# Patient Record
Sex: FEMALE | Race: WHITE | NOT HISPANIC OR LATINO | URBAN - METROPOLITAN AREA
[De-identification: names, ages, dates, MRNs, and addresses within clinical notes are randomized per-mention and may not be internally consistent; named-entity substitution may affect disease eponyms.]

---

## 2021-06-16 ENCOUNTER — TRANSCRIBE ORDERS (OUTPATIENT)
Dept: LAB | Facility: HOSPITAL | Age: 66
End: 2021-06-16

## 2021-06-16 ENCOUNTER — APPOINTMENT (OUTPATIENT)
Dept: LAB | Facility: HOSPITAL | Age: 66
End: 2021-06-16
Attending: INTERNAL MEDICINE
Payer: MEDICARE

## 2021-06-16 DIAGNOSIS — E55.9 VITAMIN D DEFICIENCY, UNSPECIFIED: ICD-10-CM

## 2021-06-16 DIAGNOSIS — Z79.899 OTHER LONG TERM (CURRENT) DRUG THERAPY: ICD-10-CM

## 2021-06-16 DIAGNOSIS — M13.0 POLYARTHRITIS, UNSPECIFIED: ICD-10-CM

## 2021-06-16 DIAGNOSIS — M13.0 POLYARTHRITIS, UNSPECIFIED: Primary | ICD-10-CM

## 2021-06-16 LAB
25(OH)D3 SERPL-MCNC: 40 NG/ML (ref 30–100)
ALBUMIN SERPL-MCNC: 4.6 G/DL (ref 3.4–5)
ALP SERPL-CCNC: 56 IU/L (ref 35–126)
ALT SERPL-CCNC: 13 IU/L (ref 11–54)
ANION GAP SERPL CALC-SCNC: 11 MEQ/L (ref 3–15)
AST SERPL-CCNC: 18 IU/L (ref 15–41)
BASOPHILS # BLD: 0.04 K/UL (ref 0.01–0.1)
BASOPHILS NFR BLD: 0.8 %
BILIRUB SERPL-MCNC: 0.9 MG/DL (ref 0.3–1.2)
BUN SERPL-MCNC: 12 MG/DL (ref 8–20)
CALCIUM SERPL-MCNC: 9.9 MG/DL (ref 8.9–10.3)
CHLORIDE SERPL-SCNC: 97 MEQ/L (ref 98–109)
CO2 SERPL-SCNC: 24 MEQ/L (ref 22–32)
CREAT SERPL-MCNC: 0.7 MG/DL (ref 0.6–1.1)
CRP SERPL-MCNC: <6 MG/L
DIFFERENTIAL METHOD BLD: NORMAL
EOSINOPHIL # BLD: 0.04 K/UL (ref 0.04–0.36)
EOSINOPHIL NFR BLD: 0.8 %
ERYTHROCYTE [DISTWIDTH] IN BLOOD BY AUTOMATED COUNT: 13 % (ref 11.7–14.4)
ERYTHROCYTE [SEDIMENTATION RATE] IN BLOOD BY WESTERGREN METHOD: 13 MM/HR
GFR SERPL CREATININE-BSD FRML MDRD: >60 ML/MIN/1.73M*2
GLUCOSE SERPL-MCNC: 98 MG/DL (ref 70–99)
HCT VFR BLDCO AUTO: 38.9 % (ref 35–45)
HGB BLD-MCNC: 12.9 G/DL (ref 11.8–15.7)
IMM GRANULOCYTES # BLD AUTO: 0.01 K/UL (ref 0–0.08)
IMM GRANULOCYTES NFR BLD AUTO: 0.2 %
LYMPHOCYTES # BLD: 1.65 K/UL (ref 1.2–3.5)
LYMPHOCYTES NFR BLD: 32.3 %
MCH RBC QN AUTO: 30.1 PG (ref 28–33.2)
MCHC RBC AUTO-ENTMCNC: 33.2 G/DL (ref 32.2–35.5)
MCV RBC AUTO: 90.9 FL (ref 83–98)
MONOCYTES # BLD: 0.53 K/UL (ref 0.28–0.8)
MONOCYTES NFR BLD: 10.4 %
NEUTROPHILS # BLD: 2.84 K/UL (ref 1.7–7)
NEUTS SEG NFR BLD: 55.5 %
NRBC BLD-RTO: 0 %
PDW BLD AUTO: 9.8 FL (ref 9.4–12.3)
PLATELET # BLD AUTO: 284 K/UL (ref 150–369)
POTASSIUM SERPL-SCNC: 4.2 MEQ/L (ref 3.6–5.1)
PROT SERPL-MCNC: 7.1 G/DL (ref 6–8.2)
RBC # BLD AUTO: 4.28 M/UL (ref 3.93–5.22)
SODIUM SERPL-SCNC: 132 MEQ/L (ref 136–144)
WBC # BLD AUTO: 5.11 K/UL (ref 3.8–10.5)

## 2021-06-16 PROCEDURE — 82306 VITAMIN D 25 HYDROXY: CPT

## 2021-06-16 PROCEDURE — 86200 CCP ANTIBODY: CPT

## 2021-06-16 PROCEDURE — 86038 ANTINUCLEAR ANTIBODIES: CPT

## 2021-06-16 PROCEDURE — 80053 COMPREHEN METABOLIC PANEL: CPT

## 2021-06-16 PROCEDURE — 86431 RHEUMATOID FACTOR QUANT: CPT

## 2021-06-16 PROCEDURE — 86140 C-REACTIVE PROTEIN: CPT

## 2021-06-16 PROCEDURE — 85025 COMPLETE CBC W/AUTO DIFF WBC: CPT

## 2021-06-16 PROCEDURE — 85652 RBC SED RATE AUTOMATED: CPT

## 2021-06-16 PROCEDURE — 36415 COLL VENOUS BLD VENIPUNCTURE: CPT

## 2021-06-17 LAB — RHEUMATOID FACT SERPL-ACNC: 9 IU/ML

## 2021-06-22 LAB — CCP IGA+IGG SERPL IA-ACNC: <8 UNITS

## 2021-06-24 LAB — ANA SER QL HEP2 SUBST: NEGATIVE

## 2021-08-04 ENCOUNTER — APPOINTMENT (EMERGENCY)
Dept: RADIOLOGY | Facility: HOSPITAL | Age: 66
End: 2021-08-04
Attending: EMERGENCY MEDICINE
Payer: MEDICARE

## 2021-08-04 ENCOUNTER — HOSPITAL ENCOUNTER (EMERGENCY)
Facility: HOSPITAL | Age: 66
Discharge: HOME | End: 2021-08-04
Attending: EMERGENCY MEDICINE
Payer: MEDICARE

## 2021-08-04 VITALS
DIASTOLIC BLOOD PRESSURE: 78 MMHG | BODY MASS INDEX: 21.69 KG/M2 | OXYGEN SATURATION: 100 % | WEIGHT: 135 LBS | TEMPERATURE: 98 F | HEART RATE: 66 BPM | HEIGHT: 66 IN | RESPIRATION RATE: 16 BRPM | SYSTOLIC BLOOD PRESSURE: 127 MMHG

## 2021-08-04 DIAGNOSIS — S06.0X0A CONCUSSION WITHOUT LOSS OF CONSCIOUSNESS, INITIAL ENCOUNTER: ICD-10-CM

## 2021-08-04 DIAGNOSIS — S69.92XA INJURY OF LEFT WRIST, INITIAL ENCOUNTER: ICD-10-CM

## 2021-08-04 DIAGNOSIS — S09.90XA CLOSED HEAD INJURY, INITIAL ENCOUNTER: ICD-10-CM
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
## 2021-08-04 PROCEDURE — 73110 X-RAY EXAM OF WRIST: CPT | Mod: LT

## 2021-08-04 PROCEDURE — 63700000 HC SELF-ADMINISTRABLE DRUG: Performed by: PHYSICIAN ASSISTANT

## 2021-08-04 PROCEDURE — 73130 X-RAY EXAM OF HAND: CPT | Mod: LT

## 2021-08-04 PROCEDURE — 99284 EMERGENCY DEPT VISIT MOD MDM: CPT | Mod: 25

## 2021-08-04 PROCEDURE — 70450 CT HEAD/BRAIN W/O DYE: CPT | Mod: ME

## 2021-08-04 RX ORDER — LISINOPRIL 40 MG/1
40 TABLET ORAL
COMMUNITY
Start: 2021-06-17 | End: 2023-01-03

## 2021-08-04 RX ORDER — ONDANSETRON 8 MG/1
8 TABLET, ORALLY DISINTEGRATING ORAL EVERY 8 HOURS PRN
Qty: 12 TABLET | Refills: 0 | Status: SHIPPED | OUTPATIENT
Start: 2021-08-04 | End: 2022-09-07

## 2021-08-04 RX ORDER — ZOLPIDEM TARTRATE 6.25 MG/1
TABLET, FILM COATED, EXTENDED RELEASE ORAL
COMMUNITY
Start: 2021-06-11 | End: 2023-02-07 | Stop reason: SDUPTHER

## 2021-08-04 RX ORDER — ONDANSETRON 4 MG/1
4 TABLET, ORALLY DISINTEGRATING ORAL ONCE
Status: COMPLETED | OUTPATIENT
Start: 2021-08-04 | End: 2021-08-04

## 2021-08-04 RX ADMIN — ONDANSETRON 4 MG: 4 TABLET, ORALLY DISINTEGRATING ORAL at 09:21

## 2021-08-04 ASSESSMENT — ENCOUNTER SYMPTOMS
PALPITATIONS: 0
DIARRHEA: 1
DIZZINESS: 0
CHEST TIGHTNESS: 0
SHORTNESS OF BREATH: 0
VOMITING: 0
HEADACHES: 1
NAUSEA: 1
BACK PAIN: 0
BLOOD IN STOOL: 0
ABDOMINAL PAIN: 0
DIFFICULTY URINATING: 0
LOSS OF CONSCIOUSNESS: 0

## 2021-08-04 NOTE — ED PROVIDER NOTES
Emergency Medicine Note  HPI   HISTORY OF PRESENT ILLNESS     67yo female presenting to ER for evaluation after fall yesterday. Pt was outdoors, tripped over a hose, and believes she landed on her left side. She hit the left side of her head. She is not on blood thinners. She did not lose consciousness. Pt began to feel very nauseous after the fall and did have episodes of diarrhea. She has been able to drink, but reports she is afraid to eat in case she vomits. Diarrhea has been watery, somewhat yellow in color. She was recently treated several weeks ago for diverticulitis and says the abdominal pain she had with that has resolved after the treatment. She did have recent bout of diverticulitis She denies any chest pain, SOB, vomiting, blurred vision, slurred speech, gait disturbance.       History provided by:  Patient   used: No    Trauma  Mechanism of injury: fall  Injury location: head/neck  Injury location detail: head  Arrived directly from scene: no     Fall:       Fall occurred: walking and tripped       Point of impact: unknown       Entrapped after fall: no    Protective equipment:        None       Suspicion of alcohol use: no       Suspicion of drug use: no    EMS/PTA data:       Bystander interventions: none       Ambulatory at scene: yes       Blood loss: none       Responsiveness: alert       Oriented to: person, place, situation and time       Loss of consciousness: no       Amnesic to event: no    Current symptoms:       Associated symptoms:             Reports headache and nausea.             Denies abdominal pain, back pain, chest pain, loss of consciousness and vomiting.         Patient History   PAST HISTORY     Reviewed from Nursing Triage:      Past Medical History:   Diagnosis Date   • Breast cancer (CMS/HCC)        Past Surgical History:   Procedure Laterality Date   • BREAST LUMPECTOMY     • HYSTERECTOMY         History reviewed. No pertinent family history.    Social  History     Tobacco Use   • Smoking status: Never Smoker   • Smokeless tobacco: Never Used   Substance Use Topics   • Alcohol use: Yes     Comment: occas   • Drug use: Never         Review of Systems   REVIEW OF SYSTEMS     Review of Systems   Respiratory: Negative for chest tightness and shortness of breath.    Cardiovascular: Negative for chest pain and palpitations.   Gastrointestinal: Positive for diarrhea and nausea. Negative for abdominal pain, blood in stool and vomiting.   Genitourinary: Negative for difficulty urinating.   Musculoskeletal: Negative for back pain.   Neurological: Positive for headaches. Negative for dizziness and loss of consciousness.         VITALS     ED Vitals    Date/Time Temp Pulse Resp BP SpO2 [facility]   08/04/21 1224 36.7 °C (98 °F) 66 16 127/78 100 % JLG   08/04/21 0859 36.6 °C (97.8 °F) 68 18 136/91 99 % LL        Pulse Ox %: 99 % (08/04/21 0859)  Pulse Ox Interpretation: Normal (08/04/21 0859)           Physical Exam   PHYSICAL EXAM     Physical Exam  Vitals and nursing note reviewed.   Constitutional:       Appearance: Normal appearance.   HENT:      Head: Normocephalic.      Comments: Left periorbital ecchymosis     Right Ear: Tympanic membrane normal.      Left Ear: Tympanic membrane normal.      Mouth/Throat:      Mouth: Mucous membranes are moist.   Eyes:      Extraocular Movements: Extraocular movements intact.      Pupils: Pupils are equal, round, and reactive to light.   Cardiovascular:      Rate and Rhythm: Normal rate and regular rhythm.   Pulmonary:      Effort: Pulmonary effort is normal.      Breath sounds: Normal breath sounds.   Abdominal:      Palpations: Abdomen is soft.      Tenderness: There is no abdominal tenderness. There is no guarding.   Musculoskeletal:      Left wrist: Swelling and tenderness (to dorsum of left hand and distal ulnar area) present. No effusion or snuff box tenderness. Decreased range of motion.      Cervical back: Normal range of motion and  neck supple. Muscular tenderness present. No spinous process tenderness.   Skin:     General: Skin is warm and dry.      Capillary Refill: Capillary refill takes less than 2 seconds.   Neurological:      Mental Status: She is alert and oriented to person, place, and time.      Cranial Nerves: No cranial nerve deficit.      Sensory: No sensory deficit.      Motor: No weakness.      Coordination: Coordination normal.      Gait: Gait normal.           PROCEDURES     Procedures     DATA     Results     None          Imaging Results          CT HEAD WITHOUT IV CONTRAST (Final result)  Result time 08/04/21 11:03:04    Final result                 Impression:    IMPRESSION:  1.  No evidence of hemorrhage or mass.  2.  Right basal ganglia hypoattenuation secondary to age indeterminant infarct  and should be clinically correlated.  3.  Findings suggestive of white matter changes mildly advanced for age but  nonspecific.    COMMENT:    Technique: Computed tomography of the brain was performed utilizing contiguous  2.5 mm transaxial sections without intravenous contrast administration.    CT DOSE:  One or more dose reduction techniques (e.g. automated exposure  control, adjustment of the mA and/or kV according to patient size, use of  iterative reconstruction technique) utilized for this examination.    Comparison studies: None.    Postsurgical change: None.    Brain parenchyma: There is no intraparenchymal hemorrhage, mass effect, midline  shift or extra-axial fluid collection.  There is a right basal ganglia focus of  hypoattenuation.  White matter changes: Mildly advanced for age but nonspecific.  Ventricles, cisterns, and sulci: Normal in size and configuration.  Sella and cerebellar tonsils: Normal in appearance.      Calvarium and extra cranial soft tissues: Normal.  Paranasal sinuses: Clear bilaterally.  Mastoid air cell: Normal  Orbits: Normal                 Narrative:    CLINICAL HISTORY: Trauma                                X-RAY WRIST LEFT 3+ VIEWS (Final result)  Result time 08/04/21 12:11:10    Final result                 Impression:    IMPRESSION:    A mildly distracted fracture of the ulnar styloid appears to be more likely  chronic; correlate clinically. See comment for additional findings.               Narrative:    CLINICAL HISTORY:    left wrist and hand pain after fall    COMMENT:   4 views of the left wrist and 4 views of the left hand are reviewed  without comparison.    No definite acute fracture or dislocation is identified.  A mildly distracted fracture of the ulnar styloid appears to be more likely  chronic; correlate clinically. There are advanced degenerative changes at the  basilar joint of the thumb and carpal joints involving the triquetrum. Joint  spaces in the hands and wrists otherwise appear within normal limits her with  mild degenerative changes.. Osteopenia is suggested.                    ED Interpretation    No acute fracture                             X-RAY HAND LEFT 3+ VIEWS (Final result)  Result time 08/04/21 12:11:10    Final result                 Impression:    IMPRESSION:    A mildly distracted fracture of the ulnar styloid appears to be more likely  chronic; correlate clinically. See comment for additional findings.               Narrative:    CLINICAL HISTORY:    left wrist and hand pain after fall    COMMENT:   4 views of the left wrist and 4 views of the left hand are reviewed  without comparison.    No definite acute fracture or dislocation is identified.  A mildly distracted fracture of the ulnar styloid appears to be more likely  chronic; correlate clinically. There are advanced degenerative changes at the  basilar joint of the thumb and carpal joints involving the triquetrum. Joint  spaces in the hands and wrists otherwise appear within normal limits her with  mild degenerative changes.. Osteopenia is suggested.                    ED Interpretation    No acute fracture                               No orders to display       Scoring tools                                 ED Course & MDM   MDM / ED COURSE and CLINICAL IMPRESSIONS     MDM    ED Course as of Aug 04 1447   Wed Aug 04, 2021   0918 Pt's neck cleared by NEXUS criteria. Pt here for evaluation after mechanical fall with head injury followed by nausea and left wrist pain. Will obtain imaging for both. Pt also mentions diarrhea, but denies abdominal pains.     [KL]   1135 IMPRESSION:  1.  No evidence of hemorrhage or mass.  2.  Right basal ganglia hypoattenuation secondary to age indeterminant infarct  and should be clinically correlated.  3.  Findings suggestive of white matter changes mildly advanced for age but  nonspecific.   CT HEAD WITHOUT IV CONTRAST [KL]   1204 Pt updated on results and evidence of age indeterminate infarct. She will follow up with PCP, orthopedics, concussion clinic. Will d/c with script for Zofran. Pt stable for d/c.     [KL]      ED Course User Index  [KL] Ramakrishna Meade PA C         Clinical Impressions as of Aug 04 1447   Fall, initial encounter   Closed head injury, initial encounter   Concussion without loss of consciousness, initial encounter   Injury of left wrist, initial encounter            Ramakrishna Meade PA C  08/04/21 1441

## 2021-08-04 NOTE — ED ATTESTATION NOTE
I have personally seen and examined the patient.  I reviewed and agree with physician assistant / nurse practitioner’s assessment and plan of care.     Exam: Patient is resting comfortably no acute distress. Her vital signs are stable and she is afebrile. Patient is awake alert and oriented with an intact neuro exam. HEENT exam reveals periorbital ecchymosis around the left eye. Pupils are equal round and reactive to light and there is no ocular entrapment. Patient's vision is baseline. Additionally, patient has tenderness of the left wrist without deformity. The extremity is neurovascularly intact.     Plan: Patient had a mechanical fall resulting in left wrist and left-sided head injury. Will check x-rays and CT of the head to further evaluate            Akbar Fried DO  08/04/21 0978

## 2021-08-04 NOTE — DISCHARGE INSTRUCTIONS
Take Tylenol and/or Motrin as needed for headache. Take Zofran as needed for nausea. Follow up with your primary care provider and concussion clinic. Follow up with orthopedics as needed. Keep your wrist in the splint, apply ice on and off for comfort.    Return to the ED for worsening of symptoms or any problems or concerns.  It is very important to follow up with your healthcare provider for re-evaluation.

## 2021-08-23 ENCOUNTER — TRANSCRIBE ORDERS (OUTPATIENT)
Dept: SCHEDULING | Facility: REHABILITATION | Age: 66
End: 2021-08-23

## 2021-08-23 DIAGNOSIS — R42 DIZZINESS AND GIDDINESS: ICD-10-CM

## 2021-08-23 DIAGNOSIS — R51.9 HEADACHE: ICD-10-CM

## 2021-08-23 DIAGNOSIS — S06.0X0S CONCUSSION WITHOUT LOSS OF CONSCIOUSNESS, SEQUELA (CMS/HCC): Primary | ICD-10-CM

## 2022-09-07 ENCOUNTER — OFFICE VISIT (OUTPATIENT)
Dept: INTERNAL MEDICINE | Facility: CLINIC | Age: 67
End: 2022-09-07
Payer: MEDICARE

## 2022-09-07 VITALS
HEART RATE: 75 BPM | DIASTOLIC BLOOD PRESSURE: 94 MMHG | SYSTOLIC BLOOD PRESSURE: 124 MMHG | RESPIRATION RATE: 17 BRPM | BODY MASS INDEX: 22.02 KG/M2 | WEIGHT: 137 LBS | OXYGEN SATURATION: 95 % | HEIGHT: 66 IN

## 2022-09-07 DIAGNOSIS — E78.5 HYPERLIPIDEMIA LDL GOAL <130: ICD-10-CM

## 2022-09-07 DIAGNOSIS — M47.819 SPONDYLOARTHRITIS: ICD-10-CM

## 2022-09-07 DIAGNOSIS — I10 PRIMARY HYPERTENSION: Primary | ICD-10-CM

## 2022-09-07 DIAGNOSIS — M81.0 AGE-RELATED OSTEOPOROSIS WITHOUT CURRENT PATHOLOGICAL FRACTURE: ICD-10-CM

## 2022-09-07 DIAGNOSIS — F51.01 PRIMARY INSOMNIA: ICD-10-CM

## 2022-09-07 DIAGNOSIS — I77.819 AORTIC DILATATION (CMS/HCC): ICD-10-CM

## 2022-09-07 DIAGNOSIS — R19.4 CHANGE IN BOWEL HABITS: ICD-10-CM

## 2022-09-07 DIAGNOSIS — Z85.3 HISTORY OF BREAST CANCER: ICD-10-CM

## 2022-09-07 DIAGNOSIS — E55.9 VITAMIN D DEFICIENCY: ICD-10-CM

## 2022-09-07 PROCEDURE — G8755 DIAS BP > OR = 90: HCPCS | Performed by: INTERNAL MEDICINE

## 2022-09-07 PROCEDURE — G8752 SYS BP LESS 140: HCPCS | Performed by: INTERNAL MEDICINE

## 2022-09-07 PROCEDURE — 99204 OFFICE O/P NEW MOD 45 MIN: CPT | Performed by: INTERNAL MEDICINE

## 2022-09-07 RX ORDER — HYDRALAZINE HYDROCHLORIDE 25 MG/1
25 TABLET, FILM COATED ORAL
COMMUNITY
End: 2023-01-04

## 2022-09-07 RX ORDER — MELOXICAM 7.5 MG/1
7.5 TABLET ORAL
COMMUNITY
Start: 2022-08-19 | End: 2023-01-23

## 2022-09-07 ASSESSMENT — ENCOUNTER SYMPTOMS
CONSTITUTIONAL NEGATIVE: 1
GASTROINTESTINAL NEGATIVE: 1
PSYCHIATRIC NEGATIVE: 1
CARDIOVASCULAR NEGATIVE: 1
RESPIRATORY NEGATIVE: 1
EYES NEGATIVE: 1
MUSCULOSKELETAL NEGATIVE: 1
NEUROLOGICAL NEGATIVE: 1
ALLERGIC/IMMUNOLOGIC NEGATIVE: 1
ENDOCRINE NEGATIVE: 1
HEMATOLOGIC/LYMPHATIC NEGATIVE: 1

## 2022-09-07 NOTE — PATIENT INSTRUCTIONS
Please ask Dr. Curtis about prolia injections for your osteoporosis    Please get your shingrix vaccine number two at the pharmacy when you get a chance

## 2022-09-07 NOTE — PROGRESS NOTES
SUBJECTIVE:   67 y.o. female for new patient visit    Here to establish care, she would like to have an annual physical, she is specifically concerned about some L hand numbness that she gets when she sleeps on her L side, when she rolls over it immediately resolves.  This has been occurring for 6 mos.  She has no shoulder or neck pain.  She does feel that her hand is weak however she feels that this started after she broke her wrist in 2017.  She is up to date on her mammograms and breast MRI within the last 6 mos which were normal without abnormal LAD.      HTN: following with cardiology, well controlled on lisinopril 40mg daily and hydralazine 25mg daily    HLD: she does follow with a cardiologist and was tried on lipitor but did not tolerate this and is planning to discuss this further with her cardiologist     Osteoporosis: this was diagnosed about 4 years ago on DEXA she has been tried on fosamax and another bisphosphonate but she was unable to tolerate due to body aches and fatigue, she has worsening in her spine on her DEXA this year and she is currently following with Dr. Curtis with whom she is following as below.      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 Curtis, getting cimzia injections    Hx breast ca: Breast cancer 06/2016 s/p lumpectomy/XRT ER/CA + HER2 neg, she has had no recurrence, she is now cured, she continues to follow with breast surgery once per year, up to date with MRI and mammo    GI distress: for the last 1.5 years she has had frequent small stools for which she is following with GI, her colonoscopy in 5/2022 was clear other than diverticulosis, she then started to have some bad cramping and thus she underwent a CT AP which demonstrated on focal narrow of the body of the stomach for which she is scheduled for upper GI in the coming weeks.      Ascending aortic dilation: 4.3 noted incidentally on recent breast MRI 2/2022 and was referred to cardiologist with whom she  now follows.      Primary insomnia: she takes ambien nightly    Past Medical History:   Diagnosis Date    Breast cancer (CMS/HCC)     Hypertension     Lipid disorder     Osteoporosis      Patient Active Problem List   Diagnosis    History of breast cancer    Hypertension    Osteoporosis    Change in bowel habits    Hyperlipidemia LDL goal <130    Primary insomnia     Past Surgical History:   Procedure Laterality Date    BREAST LUMPECTOMY      HYSTERECTOMY       Family History   Problem Relation Age of Onset    Hyperlipidemia Biological Mother     Hypertension Biological Mother     Thyroid disease Biological Mother        Current Outpatient Medications:     certolizumab pegol (CIMZIA SUBQ), Inject 1 application under the skin every 30 (thirty) days., Disp: , Rfl:     hydrALAZINE (APRESOLINE) 25 mg tablet, Take 25 mg by mouth. Takes 25 mg daily, Disp: , Rfl:     lisinopriL (PRINIVIL) 40 mg tablet, Take 40 mg by mouth once daily., Disp: , Rfl:     zolpidem CR (AMBIEN CR) 6.25 mg CR tablet, TAKE ONE TABLET BY MOUTH EVERY EVENING AS NEEDED FOR SLEEP, Disp: , Rfl:     meloxicam (MOBIC) 7.5 mg tablet, Take 7.5 mg by mouth 2 (two) times a day., Disp: , Rfl:    Social History     Tobacco Use    Smoking status: Never Smoker    Smokeless tobacco: Never Used   Vaping Use    Vaping Use: Never used   Substance Use Topics    Alcohol use: Yes     Comment: occas    Drug use: Never     Allergies   Allergen Reactions    Flaxseed Angioedema     Eyes swell, throat itches    Cat Dander        Review of Systems   Constitutional: Negative.    HENT: Negative.    Eyes: Negative.    Respiratory: Negative.    Cardiovascular: Negative.    Gastrointestinal: Negative.    Endocrine: Negative.    Genitourinary: Negative.    Musculoskeletal: Negative.    Skin: Negative.    Allergic/Immunologic: Negative.    Neurological: Negative.    Hematological: Negative.    Psychiatric/Behavioral: Negative.        OBJECTIVE:  Visit  "Vitals  BP (!) 124/94   Pulse 75   Resp 17   Ht 1.676 m (5' 6\")   Wt 62.1 kg (137 lb)   SpO2 95%   BMI 22.11 kg/m²      Gait:  Normal  Alert and well appearing   Awake and oriented with normal affect and appropriate behavior   HEENT: Normocephalic and atraumatic, pupils equal, round, OP clear with moist mucous membranes  Neck:  supple, no thyroid enlargement, no LAD  Lungs: Normal breath sounds, lungs CTA with no RRW  Heart:Regular rate rhythm with no murmurs, gallops or rubs   Abd: Soft, non-tender, normal bowel sounds; no bruits, organomegaly or masses.  Ext: No clubbing, cyanosis or edema   Neuro: Gait normal, DTR 2/4 Patellar bilaterally, CN II-XII intact  + DP pulses  Skin:  No lesions on exposed skin      Assessment/Plan     1. Primary hypertension  Relatively well controlled, following with cardiology  - continue lisinopril 40mg daily  - continue hydralazine 25mg daily  - CBC and Differential; Future  - Comprehensive metabolic panel; Future    2. Age-related osteoporosis without current pathological fracture  - longstanding, see above, did not tolerate bisphosphonates  - I have advised her to discuss prolia with Dr. Curtis at their next visit    3. History of breast cancer  - cured, see above  - following with breast surgery annually   - up to date on mammo and MRI    4. Change in bowel habits  - unclear etiology, ?IBS, though she was found to have a possible gastric stricture for which upper GI study is pending  - f/u with GI as scheduled    5. Hyperlipidemia LDL goal <130  - not at goal when last checked in April  - did not tolerate lipitor initially, she plans to discuss next steps with cards at next visit    6. Primary insomnia  - longstanding, well controlled with ambien though this is not ideal, may discuss trial of trazodone at next visit    7. Spondyloarthritis  - diagnosed and managed by Dr. Curtis, autoimmune workup negative  - continue meloxicam BID for now  - continue cimzia injections    8. Vitamin " D deficiency  - Vitamin D 25 hydroxy; Future    9. Aortic dilatation (CMS/HCC)  4.3mm on recent MRI breast   - following with cardiology, needs yearly imaging to ensure no growth  - BP control as above    Preventive Services:   Adult DTaP: utd  Pneumovax: utd  Flu Vaccine advised: Fall  Zostervax/Shingrix: advised to get at the pharmacy (due for shingles #2, first does some time in early 2021 but she cannot recall when)    Health Maintenance:  Dexascan: utd  Colonoscopy: utd  Mammogram: utd    Anticipatory Guidance   Safe Sex/STD's yes  Smoking Cessation yes  Sunscreen/ABCDs yes  Diet/Exercise yes  Recommend routing opth & dental care  yes  Screened for fall risk yes  Screened for depression yes  Satisfied with access to care yes     F/u 1 year for annual physical

## 2022-09-09 LAB
BASOPHILS # BLD AUTO: 0.1 X10E3/UL (ref 0–0.2)
BASOPHILS NFR BLD AUTO: 1 %
EOSINOPHIL # BLD AUTO: 0.1 X10E3/UL (ref 0–0.4)
EOSINOPHIL NFR BLD AUTO: 2 %
ERYTHROCYTE [DISTWIDTH] IN BLOOD BY AUTOMATED COUNT: 13.2 % (ref 11.7–15.4)
HCT VFR BLD AUTO: 36.5 % (ref 34–46.6)
HGB BLD-MCNC: 12.4 G/DL (ref 11.1–15.9)
IMM GRANULOCYTES # BLD AUTO: 0 X10E3/UL (ref 0–0.1)
IMM GRANULOCYTES NFR BLD AUTO: 1 %
LYMPHOCYTES # BLD AUTO: 1.6 X10E3/UL (ref 0.7–3.1)
LYMPHOCYTES NFR BLD AUTO: 38 %
MCH RBC QN AUTO: 29.8 PG (ref 26.6–33)
MCHC RBC AUTO-ENTMCNC: 34 G/DL (ref 31.5–35.7)
MCV RBC AUTO: 88 FL (ref 79–97)
MONOCYTES # BLD AUTO: 0.5 X10E3/UL (ref 0.1–0.9)
MONOCYTES NFR BLD AUTO: 13 %
NEUTROPHILS # BLD AUTO: 1.9 X10E3/UL (ref 1.4–7)
NEUTROPHILS NFR BLD AUTO: 45 %
PLATELET # BLD AUTO: 286 X10E3/UL (ref 150–450)
RBC # BLD AUTO: 4.16 X10E6/UL (ref 3.77–5.28)
WBC # BLD AUTO: 4.2 X10E3/UL (ref 3.4–10.8)

## 2022-09-10 LAB
25(OH)D3+25(OH)D2 SERPL-MCNC: 31.2 NG/ML (ref 30–100)
ALBUMIN SERPL-MCNC: 4.9 G/DL (ref 3.8–4.8)
ALBUMIN/GLOB SERPL: 2.9 {RATIO} (ref 1.2–2.2)
ALP SERPL-CCNC: 63 IU/L (ref 44–121)
ALT SERPL-CCNC: 13 IU/L (ref 0–32)
AST SERPL-CCNC: 20 IU/L (ref 0–40)
BILIRUB SERPL-MCNC: 0.8 MG/DL (ref 0–1.2)
BUN SERPL-MCNC: 13 MG/DL (ref 8–27)
BUN/CREAT SERPL: 20 (ref 12–28)
CALCIUM SERPL-MCNC: 9.8 MG/DL (ref 8.7–10.3)
CHLORIDE SERPL-SCNC: 96 MMOL/L (ref 96–106)
CO2 SERPL-SCNC: 23 MMOL/L (ref 20–29)
CREAT SERPL-MCNC: 0.64 MG/DL (ref 0.57–1)
EGFRCR-CYS SERPLBLD CKD-EPI 2021: 97 ML/MIN/1.73
GLOBULIN SER CALC-MCNC: 1.7 G/DL (ref 1.5–4.5)
GLUCOSE SERPL-MCNC: 85 MG/DL (ref 65–99)
POTASSIUM SERPL-SCNC: 4.3 MMOL/L (ref 3.5–5.2)
PROT SERPL-MCNC: 6.6 G/DL (ref 6–8.5)
SODIUM SERPL-SCNC: 132 MMOL/L (ref 134–144)

## 2022-09-12 PROBLEM — E87.1 HYPONATREMIA: Status: ACTIVE | Noted: 2022-09-12

## 2022-12-07 ENCOUNTER — TELEPHONE (OUTPATIENT)
Dept: INTERNAL MEDICINE | Facility: CLINIC | Age: 67
End: 2022-12-07

## 2022-12-07 RX ORDER — ROSUVASTATIN CALCIUM 5 MG/1
5 TABLET, COATED ORAL DAILY
COMMUNITY
Start: 2022-09-10 | End: 2023-01-03

## 2022-12-07 RX ORDER — EZETIMIBE 10 MG/1
10 TABLET ORAL DAILY
COMMUNITY
Start: 2022-09-28 | End: 2023-01-03

## 2022-12-07 NOTE — PROGRESS NOTES
nahomy [facility] and patient and gave her my number and she will keep track of things and let me know

## 2022-12-07 NOTE — TELEPHONE ENCOUNTER
Patient daughter is calling because her mom is in [place] and has been in admitted to the hospital since Sunday and says they are not getting good information and she is asking the doctor can call and request to know about her care    [facility]  Dr Lipscomb  [phone]     Aware McG is ooo and is asking if another provider can call      [phone] is the best call back number for the daughter Rachel

## 2022-12-15 ENCOUNTER — HOSPITAL ENCOUNTER (EMERGENCY)
Facility: HOSPITAL | Age: 67
Discharge: HOME | End: 2022-12-16
Attending: EMERGENCY MEDICINE | Admitting: EMERGENCY MEDICINE
Payer: MEDICARE

## 2022-12-15 ENCOUNTER — APPOINTMENT (EMERGENCY)
Dept: RADIOLOGY | Facility: HOSPITAL | Age: 67
End: 2022-12-15
Payer: MEDICARE

## 2022-12-15 DIAGNOSIS — R11.0 NAUSEA: Primary | ICD-10-CM

## 2022-12-15 LAB
ALBUMIN SERPL-MCNC: 4.9 G/DL (ref 3.4–5)
ALP SERPL-CCNC: 51 IU/L (ref 35–126)
ALT SERPL-CCNC: 17 IU/L (ref 11–54)
ANION GAP SERPL CALC-SCNC: 12 MEQ/L (ref 3–15)
AST SERPL-CCNC: 20 IU/L (ref 15–41)
BASOPHILS # BLD: 0.03 K/UL (ref 0.01–0.1)
BASOPHILS NFR BLD: 0.4 %
BILIRUB SERPL-MCNC: 0.7 MG/DL (ref 0.3–1.2)
BUN SERPL-MCNC: 15 MG/DL (ref 8–20)
CALCIUM SERPL-MCNC: 9.7 MG/DL (ref 8.9–10.3)
CHLORIDE SERPL-SCNC: 96 MEQ/L (ref 98–109)
CO2 SERPL-SCNC: 23 MEQ/L (ref 22–32)
CREAT SERPL-MCNC: 0.7 MG/DL (ref 0.6–1.1)
DIFFERENTIAL METHOD BLD: ABNORMAL
EOSINOPHIL # BLD: 0.04 K/UL (ref 0.04–0.36)
EOSINOPHIL NFR BLD: 0.5 %
ERYTHROCYTE [DISTWIDTH] IN BLOOD BY AUTOMATED COUNT: 13 % (ref 11.7–14.4)
GFR SERPL CREATININE-BSD FRML MDRD: >60 ML/MIN/1.73M*2
GLUCOSE SERPL-MCNC: 117 MG/DL (ref 70–99)
HCT VFR BLDCO AUTO: 37.3 % (ref 35–45)
HGB BLD-MCNC: 12.3 G/DL (ref 11.8–15.7)
IMM GRANULOCYTES # BLD AUTO: 0.04 K/UL (ref 0–0.08)
IMM GRANULOCYTES NFR BLD AUTO: 0.5 %
LYMPHOCYTES # BLD: 1.46 K/UL (ref 1.2–3.5)
LYMPHOCYTES NFR BLD: 18.6 %
MCH RBC QN AUTO: 29.7 PG (ref 28–33.2)
MCHC RBC AUTO-ENTMCNC: 33 G/DL (ref 32.2–35.5)
MCV RBC AUTO: 90.1 FL (ref 83–98)
MONOCYTES # BLD: 0.66 K/UL (ref 0.28–0.8)
MONOCYTES NFR BLD: 8.4 %
NEUTROPHILS # BLD: 5.6 K/UL (ref 1.7–7)
NEUTS SEG NFR BLD: 71.6 %
NRBC BLD-RTO: 0 %
PDW BLD AUTO: 9 FL (ref 9.4–12.3)
PLATELET # BLD AUTO: 282 K/UL (ref 150–369)
POTASSIUM SERPL-SCNC: 3.9 MEQ/L (ref 3.6–5.1)
PROT SERPL-MCNC: 7.3 G/DL (ref 6–8.2)
RBC # BLD AUTO: 4.14 M/UL (ref 3.93–5.22)
SODIUM SERPL-SCNC: 131 MEQ/L (ref 136–144)
TROPONIN I SERPL HS-MCNC: 7.5 PG/ML
TROPONIN I SERPL HS-MCNC: 8.4 PG/ML
WBC # BLD AUTO: 7.83 K/UL (ref 3.8–10.5)

## 2022-12-15 PROCEDURE — 80053 COMPREHEN METABOLIC PANEL: CPT | Performed by: EMERGENCY MEDICINE

## 2022-12-15 PROCEDURE — 71045 X-RAY EXAM CHEST 1 VIEW: CPT

## 2022-12-15 PROCEDURE — 93005 ELECTROCARDIOGRAM TRACING: CPT | Performed by: EMERGENCY MEDICINE

## 2022-12-15 PROCEDURE — 99284 EMERGENCY DEPT VISIT MOD MDM: CPT | Mod: 25

## 2022-12-15 PROCEDURE — 3E0337Z INTRODUCTION OF ELECTROLYTIC AND WATER BALANCE SUBSTANCE INTO PERIPHERAL VEIN, PERCUTANEOUS APPROACH: ICD-10-PCS | Performed by: EMERGENCY MEDICINE

## 2022-12-15 PROCEDURE — 25800000 HC PHARMACY IV SOLUTIONS: Performed by: PHYSICIAN ASSISTANT

## 2022-12-15 PROCEDURE — 93005 ELECTROCARDIOGRAM TRACING: CPT

## 2022-12-15 PROCEDURE — 3E033GC INTRODUCTION OF OTHER THERAPEUTIC SUBSTANCE INTO PERIPHERAL VEIN, PERCUTANEOUS APPROACH: ICD-10-PCS | Performed by: EMERGENCY MEDICINE

## 2022-12-15 PROCEDURE — 85025 COMPLETE CBC W/AUTO DIFF WBC: CPT

## 2022-12-15 PROCEDURE — 84484 ASSAY OF TROPONIN QUANT: CPT | Mod: 91 | Performed by: EMERGENCY MEDICINE

## 2022-12-15 PROCEDURE — 96361 HYDRATE IV INFUSION ADD-ON: CPT

## 2022-12-15 PROCEDURE — 96374 THER/PROPH/DIAG INJ IV PUSH: CPT

## 2022-12-15 PROCEDURE — 84484 ASSAY OF TROPONIN QUANT: CPT

## 2022-12-15 PROCEDURE — 63600000 HC DRUGS/DETAIL CODE: Performed by: PHYSICIAN ASSISTANT

## 2022-12-15 PROCEDURE — 84484 ASSAY OF TROPONIN QUANT: CPT | Performed by: EMERGENCY MEDICINE

## 2022-12-15 PROCEDURE — 85025 COMPLETE CBC W/AUTO DIFF WBC: CPT | Performed by: EMERGENCY MEDICINE

## 2022-12-15 PROCEDURE — 80053 COMPREHEN METABOLIC PANEL: CPT

## 2022-12-15 PROCEDURE — 36415 COLL VENOUS BLD VENIPUNCTURE: CPT

## 2022-12-15 RX ORDER — ONDANSETRON 4 MG/1
TABLET, FILM COATED ORAL
COMMUNITY
Start: 2022-12-08 | End: 2023-01-04

## 2022-12-15 RX ORDER — ONDANSETRON HYDROCHLORIDE 2 MG/ML
4 INJECTION, SOLUTION INTRAVENOUS ONCE
Status: COMPLETED | OUTPATIENT
Start: 2022-12-15 | End: 2022-12-15

## 2022-12-15 RX ORDER — SODIUM CHLORIDE 9 MG/ML
INJECTION, SOLUTION INTRAVENOUS CONTINUOUS
Status: DISCONTINUED | OUTPATIENT
Start: 2022-12-15 | End: 2022-12-16 | Stop reason: HOSPADM

## 2022-12-15 RX ADMIN — SODIUM CHLORIDE: 9 INJECTION, SOLUTION INTRAVENOUS at 23:24

## 2022-12-15 RX ADMIN — ONDANSETRON HYDROCHLORIDE 4 MG: 2 INJECTION, SOLUTION INTRAMUSCULAR; INTRAVENOUS at 23:23

## 2022-12-16 ENCOUNTER — OFFICE VISIT (OUTPATIENT)
Dept: INTERNAL MEDICINE | Facility: CLINIC | Age: 67
End: 2022-12-16
Payer: MEDICARE

## 2022-12-16 VITALS
WEIGHT: 135 LBS | RESPIRATION RATE: 18 BRPM | OXYGEN SATURATION: 97 % | SYSTOLIC BLOOD PRESSURE: 122 MMHG | TEMPERATURE: 96.7 F | BODY MASS INDEX: 21.69 KG/M2 | HEART RATE: 80 BPM | DIASTOLIC BLOOD PRESSURE: 85 MMHG | HEIGHT: 66 IN

## 2022-12-16 VITALS
SYSTOLIC BLOOD PRESSURE: 124 MMHG | HEART RATE: 88 BPM | WEIGHT: 136 LBS | RESPIRATION RATE: 18 BRPM | OXYGEN SATURATION: 97 % | HEIGHT: 66 IN | DIASTOLIC BLOOD PRESSURE: 90 MMHG | BODY MASS INDEX: 21.86 KG/M2

## 2022-12-16 DIAGNOSIS — Z85.3 HISTORY OF BREAST CANCER: ICD-10-CM

## 2022-12-16 DIAGNOSIS — I77.819 AORTIC DILATATION (CMS/HCC): ICD-10-CM

## 2022-12-16 DIAGNOSIS — E87.1 HYPONATREMIA: Primary | ICD-10-CM

## 2022-12-16 DIAGNOSIS — U09.9 POST COVID-19 CONDITION, UNSPECIFIED: ICD-10-CM

## 2022-12-16 DIAGNOSIS — I71.20 THORACIC AORTIC ANEURYSM WITHOUT RUPTURE, UNSPECIFIED PART (CMS/HCC): ICD-10-CM

## 2022-12-16 PROBLEM — I10 BENIGN ESSENTIAL HYPERTENSION: Status: ACTIVE | Noted: 2020-05-27

## 2022-12-16 LAB
ATRIAL RATE: 78
BILIRUB UR QL STRIP.AUTO: NEGATIVE MG/DL
CLARITY UR REFRACT.AUTO: CLEAR
COLOR UR AUTO: YELLOW
FLUAV RNA SPEC QL NAA+PROBE: NEGATIVE
FLUBV RNA SPEC QL NAA+PROBE: NEGATIVE
GLUCOSE UR STRIP.AUTO-MCNC: NEGATIVE MG/DL
HGB UR QL STRIP.AUTO: NEGATIVE
KETONES UR STRIP.AUTO-MCNC: 1 MG/DL
LEUKOCYTE ESTERASE UR QL STRIP.AUTO: NEGATIVE
NITRITE UR QL STRIP.AUTO: NEGATIVE
P AXIS: 39
PH UR STRIP.AUTO: 6.5 [PH]
PR INTERVAL: 158
PROT UR QL STRIP.AUTO: NEGATIVE
QRS DURATION: 82
QT INTERVAL: 374
QTC CALCULATION(BAZETT): 426
R AXIS: -31
RSV RNA SPEC QL NAA+PROBE: NEGATIVE
SARS-COV-2 RNA RESP QL NAA+PROBE: POSITIVE
SP GR UR REFRACT.AUTO: 1.01
T WAVE AXIS: 26
UROBILINOGEN UR STRIP-ACNC: 0.2 EU/DL
VENTRICULAR RATE: 78

## 2022-12-16 PROCEDURE — G8755 DIAS BP > OR = 90: HCPCS | Performed by: INTERNAL MEDICINE

## 2022-12-16 PROCEDURE — 87637 SARSCOV2&INF A&B&RSV AMP PRB: CPT | Performed by: INTERNAL MEDICINE

## 2022-12-16 PROCEDURE — G8752 SYS BP LESS 140: HCPCS | Performed by: INTERNAL MEDICINE

## 2022-12-16 PROCEDURE — 99214 OFFICE O/P EST MOD 30 MIN: CPT | Performed by: INTERNAL MEDICINE

## 2022-12-16 PROCEDURE — 81003 URINALYSIS AUTO W/O SCOPE: CPT | Performed by: PHYSICIAN ASSISTANT

## 2022-12-16 RX ORDER — FAMOTIDINE 20 MG/1
20 TABLET, FILM COATED ORAL 2 TIMES DAILY
Qty: 20 TABLET | Refills: 0 | Status: SHIPPED | OUTPATIENT
Start: 2022-12-16 | End: 2023-02-14

## 2022-12-16 RX ORDER — ONDANSETRON 4 MG/1
4 TABLET, ORALLY DISINTEGRATING ORAL EVERY 8 HOURS PRN
Qty: 12 TABLET | Refills: 0 | Status: SHIPPED | OUTPATIENT
Start: 2022-12-16 | End: 2023-01-23 | Stop reason: SDUPTHER

## 2022-12-16 ASSESSMENT — ENCOUNTER SYMPTOMS
DIZZINESS: 0
SHORTNESS OF BREATH: 0
DIARRHEA: 1
BACK PAIN: 1
RESPIRATORY NEGATIVE: 1
CARDIOVASCULAR NEGATIVE: 1
FEVER: 0
HEADACHES: 1
NAUSEA: 1
FATIGUE: 1
DIARRHEA: 1
NAUSEA: 1
APPETITE CHANGE: 1
VOMITING: 0
HEMATOLOGIC/LYMPHATIC NEGATIVE: 1
ARTHRALGIAS: 1
ENDOCRINE NEGATIVE: 1
HEADACHES: 0
CHILLS: 0
PSYCHIATRIC NEGATIVE: 1
ALLERGIC/IMMUNOLOGIC NEGATIVE: 1

## 2022-12-16 NOTE — ED ATTESTATION NOTE
I have personally seen and examined the patient.  I reviewed and agree with physician assistant / nurse practitioner’s assessment and plan of care    My examination, assessment, and plan of care of  is as follows:     Patient presents with some nausea today has Zofran at home but still feeling nauseous, had a loose stool no abdominal pain no chest pain no shortness of breath no fevers no chills no URI symptoms she had COVID earlier this month but tested negative since then.  Patient was recently in [facility] where she was admitted for nausea and vomiting she had low sodium at that time her sodium seems fine today on our lab work    Exam  Patient is awake alert oriented no acute distress  Lungs are clear bilateral to auscultation  Heart regular rate and rhythm  Abdomen is soft nontender nondistended with normal bowel sounds  Skin warm and dry    Plan  EKG is nonischemic 2 troponins are flat, sodium is 131 slightly low but should not be causing symptoms like this.  Patient is feeling better at this time she needs a refill on her Zofran and we will start Pepcid as she says she feels like her stomach is empty she may be developing gastritis or an ulcer.  We will have her follow-up with Antonio Hampton MD  12/15/22 3193

## 2022-12-16 NOTE — ED PROVIDER NOTES
Emergency Medicine Note  HPI   HISTORY OF PRESENT ILLNESS     67 year old female with a history of hypertension, hyperlipidemia, presents to the ER for nausea. Pt says that she was in [place] recently where she was seen at the ER for nausea and vomiting and admitted due to hyponatremia. Her sodium was repleted and she was discharged with salt tablets. She felt better but over the last couple of days began to feel nauseas again. Shes also had some diarrhea. She denies any vomiting, fevers, chills, abdominal pain, headache, confusion, dizziness, vision changes or any other complaint.             Patient History   PAST HISTORY     Reviewed from Nursing Triage:       Past Medical History:   Diagnosis Date   • Breast cancer (CMS/HCC)    • Hypertension    • Lipid disorder    • Osteoporosis        Past Surgical History:   Procedure Laterality Date   • BREAST LUMPECTOMY     • HYSTERECTOMY         Family History   Problem Relation Age of Onset   • Hyperlipidemia Biological Mother    • Hypertension Biological Mother    • Thyroid disease Biological Mother        Social History     Tobacco Use   • Smoking status: Never   • Smokeless tobacco: Never   Vaping Use   • Vaping Use: Never used   Substance Use Topics   • Alcohol use: Yes     Comment: occas   • Drug use: Never         Review of Systems   REVIEW OF SYSTEMS     Review of Systems   Constitutional: Negative for chills and fever.   Eyes: Negative for visual disturbance.   Respiratory: Negative for shortness of breath.    Cardiovascular: Negative for chest pain.   Gastrointestinal: Positive for diarrhea and nausea. Negative for vomiting.   Neurological: Negative for dizziness, syncope and headaches.         VITALS     ED Vitals    Date/Time Temp Pulse Resp BP SpO2 [facility]   12/16/22 0003 35.9 °C (96.7 °F) 80 18 122/85 97 % EMU   12/15/22 2158 35.4 °C (95.8 °F) 87 16 126/93 95 % FM   12/15/22 1827 36.9 °C (98.4 °F) 88 19 152/100 96 % AMT        Pulse Ox %: 95 % (12/15/22  2316)  Pulse Ox Interpretation: Normal (12/15/22 2316)  Heart Rate: 87 (12/15/22 2316)  Rhythm Strip Interpretation: Normal Sinus Rhythm (12/15/22 2316)     Physical Exam   PHYSICAL EXAM     Physical Exam  Constitutional:       General: She is not in acute distress.     Appearance: Normal appearance. She is not ill-appearing, toxic-appearing or diaphoretic.   HENT:      Head: Normocephalic and atraumatic.   Eyes:      Extraocular Movements: Extraocular movements intact.      Conjunctiva/sclera: Conjunctivae normal.      Pupils: Pupils are equal, round, and reactive to light.   Cardiovascular:      Rate and Rhythm: Normal rate.   Pulmonary:      Effort: Pulmonary effort is normal.      Breath sounds: Normal breath sounds.   Abdominal:      General: There is no distension.      Palpations: Abdomen is soft.      Tenderness: There is no abdominal tenderness. There is no guarding or rebound.   Musculoskeletal:      Cervical back: Neck supple.   Skin:     General: Skin is warm and dry.   Neurological:      General: No focal deficit present.      Mental Status: She is alert and oriented to person, place, and time.           PROCEDURES     Procedures     DATA     Results     Procedure Component Value Units Date/Time    Urinalysis with Reflex Culture (ED and Outpatient only) [[id number]]  (Abnormal) Collected: 12/16/22 0023    Specimen: Urine, Clean Catch Updated: 12/16/22 0034    Narrative:      The following orders were created for panel order Urinalysis with Reflex Culture (ED and Outpatient only).  Procedure                               Abnormality         Status                     ---------                               -----------         ------                     UA Reflex to Culture (Ma...[[id number]]  Abnormal            Final result                 Please view results for these tests on the individual orders.    UA Reflex to Culture (Macroscopic) [[id number]]  (Abnormal) Collected: 12/16/22 0023    Specimen: Urine,  Clean Catch Updated: 12/16/22 0034     Color, Urine Yellow     Clarity, Urine Clear     Specific Gravity, Urine 1.009     pH, Urine 6.5     Leukocyte Esterase Negative     Comment: Results can be falsely negative due to high specific gravity, some antibiotics, glucose >3 g/dl, or WBC other than neutrophils.        Nitrite, Urine Negative     Protein, Urine Negative     Glucose, Urine Negative mg/dL      Ketones, Urine +1 mg/dL      Comment: Free sulfhydryl drugs such as Mesna, Capoten, and Acetylcysteine (Mucomyst) may cause false positive ketonuria.        Urobilinogen, Urine 0.2 EU/dL      Bilirubin, Urine Negative mg/dL      Blood, Urine Negative     Comment: The sensitivity of the occult blood test is equivalent to approximately 4 intact RBC/HPF.       HS Troponin I (with 2 hour reflex) [[id number]]  (Normal) Collected: 12/15/22 1927    Specimen: Blood, Venous Updated: 12/15/22 2016     High Sens Troponin I 8.4 pg/mL     Comprehensive metabolic panel [[id number]]  (Abnormal) Collected: 12/15/22 1927    Specimen: Blood, Venous Updated: 12/15/22 2005     Sodium 131 mEQ/L      Potassium 3.9 mEQ/L      Comment: Results obtained on plasma. Plasma Potassium values may be up to 0.4 mEQ/L less than serum values. The differences may be greater for patients with high platelet or white cell counts.        Chloride 96 mEQ/L      CO2 23 mEQ/L      BUN 15 mg/dL      Creatinine 0.7 mg/dL      Glucose 117 mg/dL      Calcium 9.7 mg/dL      AST (SGOT) 20 IU/L      ALT (SGPT) 17 IU/L      Alkaline Phosphatase 51 IU/L      Total Protein 7.3 g/dL      Comment: Test performed on plasma which typically contains approximately 0.4 g/dL more protein than serum.        Albumin 4.9 g/dL      Bilirubin, Total 0.7 mg/dL      eGFR >60.0 mL/min/1.73m*2      Anion Gap 12 mEQ/L     CBC and differential [[id number]]  (Abnormal) Collected: 12/15/22 1927    Specimen: Blood, Venous Updated: 12/15/22 1950     WBC 7.83 K/uL      RBC 4.14 M/uL       Hemoglobin 12.3 g/dL      Hematocrit 37.3 %      MCV 90.1 fL      MCH 29.7 pg      MCHC 33.0 g/dL      RDW 13.0 %      Platelets 282 K/uL      MPV 9.0 fL      Differential Type Auto     nRBC 0.0 %      Immature Granulocytes 0.5 %      Neutrophils 71.6 %      Lymphocytes 18.6 %      Monocytes 8.4 %      Eosinophils 0.5 %      Basophils 0.4 %      Immature Granulocytes, Absolute 0.04 K/uL      Neutrophils, Absolute 5.60 K/uL      Lymphocytes, Absolute 1.46 K/uL      Monocytes, Absolute 0.66 K/uL      Eosinophils, Absolute 0.04 K/uL      Basophils, Absolute 0.03 K/uL           Imaging Results          X-RAY CHEST 1 VIEW (Preliminary result)  Result time 12/15/22 23:16:28    ED Interpretation    Chest x-ray is unremarkable                              ECG 12 lead   ED Interpretation   EKG shows a normal sinus rhythm at a rate of 70 bpm with a left axis deviation normal ST segments and nonspecific T wave changes          Scoring tools                                  ED Course & MDM   MDM / ED COURSE / CLINICAL IMPRESSION / DISPO     MDM    ED Course as of 12/16/22 0147   Fri Dec 16, 2022   0146 Nausea + improves with zofran.  No vomiting in ER.  Benign abdominal exam  Labs are unremarkable other than slightly low Na of 131.  Pt is stable for discharge  [AC]      ED Course User Index  [AC] Tatyana Ferrara PA C     Clinical Impression      Nausea     _________________     ED Disposition   Discharge                   Tatyana Ferrara PA C  12/16/22 0147

## 2022-12-16 NOTE — PROGRESS NOTES
Was in [place] hospital after covid diagnosed 12/1 with few symptoms and 12/3 [place]  And went to local hospital and she had very low Na.  And sodium was repleted  And was dc on 12/9 and came from [place] on 12/10 and was feeling better but then she started to feel a little better  Until yesterday and her sodium was low again.  Has been taking salt and electrolytes all  the while and she left the ER last night and got zofran and fluids.  It is unclear if the low Na and appetite loss are related to the the covid and her bp has been up a bit ..  She is seeing  her cardioogist. Today.  Is followed by Valentin Long for her breast cancer.  Breast ca 2016 and STACIA 2017  She never feels good in the am and she has been studied  At [facility] and her sodium began to fall between 2018 and 2019 and th most rcent sodium  And the lowest recorded 120 in [place].    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   • Aortic dilatation (CMS/HCC) 09/07/2022   • Hyponatremia 09/12/2022   • Benign essential hypertension 05/27/2020   • Thoracic aortic aneurysm without rupture 05/13/2022   • Hyponatremia 12/16/2022     Resolved Ambulatory Problems     Diagnosis Date Noted   • No Resolved Ambulatory Problems     Past Medical History:   Diagnosis Date   • Breast cancer (CMS/HCC)    • COVID-19 12/01/2022   • Lipid disorder      Past Surgical History:   Procedure Laterality Date   • BREAST LUMPECTOMY     • HYSTERECTOMY       Allergies   Allergen Reactions   • Flaxseed Angioedema     Eyes swell,  throat itches   • Cat Dander      Immunization History   Administered Date(s) Administered   • Influenza Vaccine High Dose 65 And Older 09/16/2021   • Influenza, Unspecified 11/13/2007, 10/15/2008, 10/08/2013, 10/07/2014, 11/02/2015, 10/10/2016, 10/03/2017, 09/28/2018, 09/16/2021   • Pneumococcal Conjugate 13-Valent 08/05/2020   • Pneumococcal Polysaccharide 11/18/2021   • SARS-COV-2 (COVID-19) VACCINE, MODERNA 01/22/2021, 02/23/2021, 09/09/2021, 04/15/2022   • Tdap 06/01/2016   • Zoster 11/02/2015     Current Outpatient Medications on File Prior to Visit   Medication Sig Dispense Refill   • famotidine (PEPCID) 20 mg tablet Take 1 tablet (20 mg total) by mouth 2 (two) times a day for 10 days. 20 tablet 0   • hydrALAZINE (APRESOLINE) 25 mg tablet Take 25 mg by mouth. Takes 25 mg daily     • ondansetron ODT (ZOFRAN-ODT) 4 mg disintegrating tablet Take 1 tablet (4 mg total) by mouth every 8 (eight) hours as needed for nausea or vomiting. 12 tablet 0   • zolpidem CR (AMBIEN CR) 6.25 mg CR tablet TAKE ONE TABLET BY MOUTH EVERY EVENING AS NEEDED FOR SLEEP     • certolizumab pegol (CIMZIA SUBQ) Inject 1 application under the skin every 30 (thirty) days.     • ezetimibe (ZETIA) 10 mg tablet Take 10 mg by mouth daily.     • lisinopriL (PRINIVIL) 40 mg tablet Take 40 mg by mouth once daily.     • meloxicam (MOBIC) 7.5 mg tablet Take 7.5 mg by mouth 2 (two) times a day.     • ondansetron (ZOFRAN) 4 mg tablet TAKE 1 TABLET BY MOUTH EVERY 8 HOURS AS NEEDED FOR NAUSEA AND VOMITING     • rosuvastatin (CRESTOR) 5 mg tablet Take 5 mg by mouth daily.       No current facility-administered medications on file prior to visit.     Review of Systems   Constitutional: Positive for appetite change and fatigue.   HENT: Positive for congestion.    Respiratory: Negative.    Cardiovascular: Negative.    Gastrointestinal: Positive for diarrhea and nausea.   Endocrine: Negative.    Genitourinary: Negative.    Musculoskeletal: Positive for  "arthralgias and back pain.   Skin: Negative.    Allergic/Immunologic: Negative.    Neurological: Positive for headaches.   Hematological: Negative.    Psychiatric/Behavioral: Negative.    All other systems reviewed and are negative.        Visit Vitals  /90   Pulse 88   Resp 18   Ht 1.676 m (5' 6\")   Wt 61.7 kg (136 lb)   SpO2 97%   BMI 21.95 kg/m²     Physical Exam  HENT:      Head: Normocephalic and atraumatic.      Right Ear: External ear normal.      Left Ear: External ear normal.   Eyes:      Conjunctiva/sclera: Conjunctivae normal.      Pupils: Pupils are equal, round, and reactive to light.   Cardiovascular:      Rate and Rhythm: Normal rate and regular rhythm.      Heart sounds: Normal heart sounds.   Pulmonary:      Effort: Pulmonary effort is normal.      Breath sounds: Normal breath sounds.   Chest:   Breasts:     Right: Normal.      Left: Inverted nipple present.   Abdominal:      General: Bowel sounds are normal.      Palpations: Abdomen is soft.   Musculoskeletal:         General: Normal range of motion.      Left hand: Swelling present.      Cervical back: Normal range of motion and neck supple.   Skin:     General: Skin is warm and dry.      Capillary Refill: Capillary refill takes less than 2 seconds.   Neurological:      Mental Status: She is alert and oriented to person, place, and time.   Psychiatric:         Behavior: Behavior normal.         Thought Content: Thought content normal.         Judgment: Judgment normal.     This is a new patient to me and she seems to be fragile of health.  All of her issues could be explained by covid but her hyponatremia preceeded her covid but got much worse needing her to be admitted to hospital in [place] where it down as low as 120  Jolanta was seen today for tcm.from the [place] admission and her visit to [facility] ER last night where she was given fluids and zofran.    Diagnoses and all orders for this visit:    Hyponatremia  Comments:  she has SIADH and " reason unclear and is recovering from covid    Aortic dilatation (CMS/HCC)  Followed by  Previous physicians and no data  In her chart regarding this diagnosis and her chest xray yeaterday was normal  Thoracic aortic aneurysm without rupture, unspecified part    History of breast cancer  Comments:  no recurrence    Post covid-19 condition, unspecified  Comments:  will redo her PCR

## 2022-12-27 ENCOUNTER — PATIENT OUTREACH (OUTPATIENT)
Dept: CASE MANAGEMENT | Facility: CLINIC | Age: 67
End: 2022-12-27
Payer: MEDICARE

## 2022-12-27 ASSESSMENT — ENCOUNTER SYMPTOMS
MYALGIAS: 1
CARDIOVASCULAR NEGATIVE: 1
RESPIRATORY NEGATIVE: 1
FATIGUE: 1
APPETITE CHANGE: 1
ACTIVITY CHANGE: 1
GASTROINTESTINAL NEGATIVE: 1

## 2022-12-27 NOTE — PROGRESS NOTES
NAME: Jolanta Bro    MRN: [id number]    YOB: 1955    Event Review:    Initial TCM Patient Outreach Date: 12/27/22    Assessment completed with: Patient  Patient stated reason for hospitalization: nausea and vomiting  Discharge Diagnosis: Hypnonatremia    Patient readmitted in the last 30 days: No  Discharging Facility: [facility]  Date of Last Admission: 12/22/22  Date of Last Discharge: 12/23/22    Discharging Facilty SNF/Rehab: no    Patient's perception of their health status since discharge: Improving    HPI:      Patient with PMH of HTN and HLD presented to [place] ER with nausea and vomiting . She was diagnosed with Hyponatremia.  During admission patient received IVF and Zofran.   She had tested positive for COVID on 12/16 did not complete course of Paxlovid. Patient denies fever or cough.  Sodium upon admission was 120 and on discharge was 134. Patient is taking her sodium tablets as prescribed.  She thinks her myalgia is from the lower sodium.  Patient does report feeling better but tired.  Patient aware to follow up with all disciplines of her care.        Review of Systems   Constitutional: Positive for activity change, appetite change and fatigue.   Respiratory: Negative.    Cardiovascular: Negative.    Gastrointestinal: Negative.    Musculoskeletal: Positive for myalgias.   All other systems reviewed and are negative.      Medication Review:    Medication Review: Yes     Reported by: Patient  Any new medications prescribed at discharge?: Yes  Is the patient having any side effects they believe may be caused by any medication additions or changes?: No  New prescriptions filled?: Yes     Do you have enough of your regularly prescribed medications?: Yes       Medication adherence problem?: No  Was a medication discrepancy indentified?: No       Nursing Interventions: No intervention needed  Reconciled the current and discharge medications:  Yes  Reviewed AVS (Discharge Instructions)?: Yes     Acute Pain:    Acute pain: Yes  Location of acute pain: myalgia    Chronic Pain:    Chronic pain: No     Diet/Nutrition:    Type of Diet: Regular  Diet Adherence: Adherent with diet       Home Care Services:  Home Care Interventions: No intervention required     Post-Discharge Durable Medical Equipment::    Durable Medical Equipment: None  Oxygen Use: No     DME Interventions: No intervention required    Home Management:    Living Arrangement: Alone  Support System:: Family, Child/Children     Home Monitoring: None (Split Level)  Any patient reported falls in the last 3 months?: No  Jain or spiritual beliefs that impact treatment?: No    Appointment Scheduling:    PCP appointment scheduled: Yes  TCM Appointment Type: [facility] 14 Day  Appointment Date: 01/04/23     Appointment Provider: Dr. Ace        Follow-Ups:     Relevant Labs & Tests:   Relevant Specialist Follow-ups: Nephrology    Interventions/ Care Coordination:    Interventions/ Care Coordination: Encouraged patient to call PCP/Specialist        Reviewed signs/symptoms of worsening condition or complication that necessitate a call to the Physician's office.  Educated patient on access to care.  RN phone number given for future care management.    Amy Brower RN  Ambulatory Care Manager  [phone]

## 2023-01-03 PROBLEM — R42 LIGHTHEADEDNESS: Status: ACTIVE | Noted: 2022-12-23

## 2023-01-04 ENCOUNTER — OFFICE VISIT (OUTPATIENT)
Dept: INTERNAL MEDICINE | Facility: CLINIC | Age: 68
End: 2023-01-04
Payer: MEDICARE

## 2023-01-04 VITALS
SYSTOLIC BLOOD PRESSURE: 124 MMHG | WEIGHT: 135 LBS | HEIGHT: 66 IN | HEART RATE: 83 BPM | OXYGEN SATURATION: 96 % | BODY MASS INDEX: 21.69 KG/M2 | RESPIRATION RATE: 17 BRPM | DIASTOLIC BLOOD PRESSURE: 84 MMHG

## 2023-01-04 DIAGNOSIS — E87.1 HYPONATREMIA: Primary | ICD-10-CM

## 2023-01-04 DIAGNOSIS — U07.1 COVID-19: ICD-10-CM

## 2023-01-04 DIAGNOSIS — I10 BENIGN ESSENTIAL HYPERTENSION: ICD-10-CM

## 2023-01-04 DIAGNOSIS — N91.2 AMENORRHEA: ICD-10-CM

## 2023-01-04 DIAGNOSIS — Z85.3 HISTORY OF BREAST CANCER: ICD-10-CM

## 2023-01-04 DIAGNOSIS — U09.9 POST COVID-19 CONDITION, UNSPECIFIED: ICD-10-CM

## 2023-01-04 DIAGNOSIS — I77.819 AORTIC DILATATION (CMS/HCC): ICD-10-CM

## 2023-01-04 DIAGNOSIS — F51.01 PRIMARY INSOMNIA: ICD-10-CM

## 2023-01-04 DIAGNOSIS — M81.0 OSTEOPOROSIS, UNSPECIFIED OSTEOPOROSIS TYPE, UNSPECIFIED PATHOLOGICAL FRACTURE PRESENCE: ICD-10-CM

## 2023-01-04 DIAGNOSIS — R53.1 WEAKNESS: ICD-10-CM

## 2023-01-04 DIAGNOSIS — D50.9 IRON DEFICIENCY ANEMIA, UNSPECIFIED IRON DEFICIENCY ANEMIA TYPE: ICD-10-CM

## 2023-01-04 DIAGNOSIS — E78.5 HYPERLIPIDEMIA LDL GOAL <130: ICD-10-CM

## 2023-01-04 DIAGNOSIS — R42 LIGHTHEADEDNESS: ICD-10-CM

## 2023-01-04 DIAGNOSIS — I15.9 SECONDARY HYPERTENSION: ICD-10-CM

## 2023-01-04 DIAGNOSIS — R25.2 MUSCLE CRAMPS: ICD-10-CM

## 2023-01-04 DIAGNOSIS — R53.83 OTHER FATIGUE: ICD-10-CM

## 2023-01-04 DIAGNOSIS — R19.4 CHANGE IN BOWEL HABITS: ICD-10-CM

## 2023-01-04 DIAGNOSIS — I71.20 THORACIC AORTIC ANEURYSM WITHOUT RUPTURE, UNSPECIFIED PART (CMS/HCC): ICD-10-CM

## 2023-01-04 LAB
ANION GAP SERPL CALC-SCNC: 9 MEQ/L (ref 3–15)
BASOPHILS # BLD: 0.06 K/UL (ref 0.01–0.1)
BASOPHILS NFR BLD: 1 %
BUN SERPL-MCNC: 15 MG/DL (ref 8–20)
CALCIUM SERPL-MCNC: 9.5 MG/DL (ref 8.9–10.3)
CHLORIDE SERPL-SCNC: 104 MEQ/L (ref 98–109)
CK SERPL-CCNC: 40 U/L (ref 15–200)
CO2 SERPL-SCNC: 22 MEQ/L (ref 22–32)
CORTIS SERPL-MCNC: 7.1 UG/DL
CREAT SERPL-MCNC: 0.7 MG/DL (ref 0.6–1.1)
DIFFERENTIAL METHOD BLD: ABNORMAL
EOSINOPHIL # BLD: 0.23 K/UL (ref 0.04–0.36)
EOSINOPHIL NFR BLD: 3.8 %
ERYTHROCYTE [DISTWIDTH] IN BLOOD BY AUTOMATED COUNT: 13.4 % (ref 11.7–14.4)
FSH SERPL-ACNC: 160.6 IU/L
GFR SERPL CREATININE-BSD FRML MDRD: >60 ML/MIN/1.73M*2
GLUCOSE SERPL-MCNC: 93 MG/DL (ref 70–99)
HCT VFR BLDCO AUTO: 35.3 % (ref 35–45)
HGB BLD-MCNC: 11.7 G/DL (ref 11.8–15.7)
IMM GRANULOCYTES # BLD AUTO: 0.01 K/UL (ref 0–0.08)
IMM GRANULOCYTES NFR BLD AUTO: 0.2 %
IRON SATN MFR SERPL: 17 % (ref 15–45)
IRON SERPL-MCNC: 64 UG/DL (ref 35–150)
LH SERPL-ACNC: 37.1 IU/L
LYMPHOCYTES # BLD: 1.3 K/UL (ref 1.2–3.5)
LYMPHOCYTES NFR BLD: 21.7 %
MCH RBC QN AUTO: 30.2 PG (ref 28–33.2)
MCHC RBC AUTO-ENTMCNC: 33.1 G/DL (ref 32.2–35.5)
MCV RBC AUTO: 91 FL (ref 83–98)
MONOCYTES # BLD: 0.6 K/UL (ref 0.28–0.8)
MONOCYTES NFR BLD: 10 %
NEUTROPHILS # BLD: 3.8 K/UL (ref 1.7–7)
NEUTS SEG NFR BLD: 63.3 %
NRBC BLD-RTO: 0 %
PDW BLD AUTO: 9.8 FL (ref 9.4–12.3)
PLATELET # BLD AUTO: 307 K/UL (ref 150–369)
POTASSIUM SERPL-SCNC: 3.8 MEQ/L (ref 3.6–5.1)
RBC # BLD AUTO: 3.88 M/UL (ref 3.93–5.22)
SODIUM SERPL-SCNC: 135 MEQ/L (ref 136–144)
T4 FREE SERPL-MCNC: 0.89 NG/DL (ref 0.58–1.64)
TIBC SERPL-MCNC: 371 UG/DL (ref 270–460)
TSH SERPL DL<=0.05 MIU/L-ACNC: 0.98 MIU/L (ref 0.34–5.6)
UIBC SERPL-MCNC: 307 UG/DL (ref 180–360)
WBC # BLD AUTO: 6 K/UL (ref 3.8–10.5)

## 2023-01-04 PROCEDURE — 99214 OFFICE O/P EST MOD 30 MIN: CPT | Performed by: INTERNAL MEDICINE

## 2023-01-04 PROCEDURE — 87637 SARSCOV2&INF A&B&RSV AMP PRB: CPT | Performed by: INTERNAL MEDICINE

## 2023-01-04 PROCEDURE — G8752 SYS BP LESS 140: HCPCS | Performed by: INTERNAL MEDICINE

## 2023-01-04 PROCEDURE — 84439 ASSAY OF FREE THYROXINE: CPT | Performed by: INTERNAL MEDICINE

## 2023-01-04 PROCEDURE — 82533 TOTAL CORTISOL: CPT | Performed by: INTERNAL MEDICINE

## 2023-01-04 PROCEDURE — 83001 ASSAY OF GONADOTROPIN (FSH): CPT | Performed by: INTERNAL MEDICINE

## 2023-01-04 PROCEDURE — 83550 IRON BINDING TEST: CPT | Performed by: INTERNAL MEDICINE

## 2023-01-04 PROCEDURE — G8754 DIAS BP LESS 90: HCPCS | Performed by: INTERNAL MEDICINE

## 2023-01-04 PROCEDURE — 85025 COMPLETE CBC W/AUTO DIFF WBC: CPT | Performed by: INTERNAL MEDICINE

## 2023-01-04 PROCEDURE — 80048 BASIC METABOLIC PNL TOTAL CA: CPT | Performed by: INTERNAL MEDICINE

## 2023-01-04 PROCEDURE — 84443 ASSAY THYROID STIM HORMONE: CPT | Performed by: INTERNAL MEDICINE

## 2023-01-04 PROCEDURE — 82550 ASSAY OF CK (CPK): CPT | Performed by: INTERNAL MEDICINE

## 2023-01-04 RX ORDER — HYDRALAZINE HYDROCHLORIDE 25 MG/1
50 TABLET, FILM COATED ORAL 2 TIMES DAILY
COMMUNITY
Start: 2023-01-04 | End: 2023-01-23

## 2023-01-04 ASSESSMENT — ENCOUNTER SYMPTOMS
RESPIRATORY NEGATIVE: 1
CARDIOVASCULAR NEGATIVE: 1
SINUS PRESSURE: 1
NECK PAIN: 1
NECK STIFFNESS: 1
HEMATOLOGIC/LYMPHATIC NEGATIVE: 1
EYE ITCHING: 1
APPETITE CHANGE: 1
SINUS PAIN: 1
CONSTIPATION: 1
ENDOCRINE NEGATIVE: 1
ARTHRALGIAS: 1
MYALGIAS: 1
NERVOUS/ANXIOUS: 1
FATIGUE: 1
ABDOMINAL PAIN: 1
NEUROLOGICAL NEGATIVE: 1
BACK PAIN: 1

## 2023-01-04 NOTE — PROGRESS NOTES
Was in ER and also admitted to [facility] and her Na was 134 when she was discharged and she was ok but her muscles hurt headaches and nausea and her BP was elevated to 167/114 and she took another hydralazine.  She has to nap during the day and has not been able to be very active.  She is having trouble taking the salt tabs and her stomach feels shredded and she had to take an extra 25 of hydralazine.  Her breast cancer was in 2016 and she had a lumpectomy and XRT and she was put on hormone and had to have a [facility] bso in 2017.  Her stomach  muscles are cramping and she feels tightness in her feet and the back of her legs. Her feet are heavy and throbbing.  Her hands throb and her feet throb and she can feel her heart beat.  Cannot sleep without the sleeping pill.  Was in cimzia for awhile and she also says she has osteoporosis and is under the care of rheumatologist. She has had her previous health care in the [facility] [facility] system.    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  Past Surgical History:   Procedure Laterality Date   • BREAST LUMPECTOMY     • HYSTERECTOMY       Allergies   Allergen Reactions   • Flaxseed Angioedema     Eyes swell, throat itches   • Cat Dander      Immunization History   Administered Date(s)  Administered   • Influenza Vaccine High Dose 65 And Older 09/16/2021   • Influenza, Unspecified 11/13/2007, 10/15/2008, 10/08/2013, 10/07/2014, 11/02/2015, 10/10/2016, 10/03/2017, 09/28/2018, 09/16/2021   • Pneumococcal Conjugate 13-Valent 08/05/2020   • Pneumococcal Polysaccharide 11/18/2021   • SARS-COV-2 (COVID-19) VACCINE, MODERNA 01/22/2021, 02/23/2021, 09/09/2021, 04/15/2022   • Tdap 06/01/2016   • Zoster 11/02/2015     Current Outpatient Medications on File Prior to Visit   Medication Sig Dispense Refill   • hydrALAZINE (APRESOLINE) 25 mg tablet Take 25 mg by mouth. Takes 25 mg daily     • meloxicam (MOBIC) 7.5 mg tablet Take 7.5 mg by mouth 2 (two) times a day.     • ondansetron (ZOFRAN) 4 mg tablet TAKE 1 TABLET BY MOUTH EVERY 8 HOURS AS NEEDED FOR NAUSEA AND VOMITING     • ondansetron ODT (ZOFRAN-ODT) 4 mg disintegrating tablet Take 1 tablet (4 mg total) by mouth every 8 (eight) hours as needed for nausea or vomiting. 12 tablet 0   • sodium chloride 1 gram tablet Take 2 g by mouth 2 times daily.     • zolpidem CR (AMBIEN CR) 6.25 mg CR tablet TAKE ONE TABLET BY MOUTH EVERY EVENING AS NEEDED FOR SLEEP     • famotidine (PEPCID) 20 mg tablet Take 1 tablet (20 mg total) by mouth 2 (two) times a day for 10 days. 20 tablet 0     No current facility-administered medications on file prior to visit.     Review of Systems   Constitutional: Positive for appetite change and fatigue.   HENT: Positive for congestion, sinus pressure and sinus pain.    Eyes: Positive for itching.   Respiratory: Negative.    Cardiovascular: Negative.    Gastrointestinal: Positive for abdominal pain and constipation.   Endocrine: Negative.    Genitourinary: Negative.    Musculoskeletal: Positive for arthralgias, back pain, myalgias, neck pain and neck stiffness.   Skin: Negative.    Allergic/Immunologic: Positive for environmental allergies.   Neurological: Negative.    Hematological: Negative.    Psychiatric/Behavioral: The patient is  "nervous/anxious.    All other systems reviewed and are negative.    Visit Vitals  /84   Pulse 83   Resp 17   Ht 1.676 m (5' 6\")   Wt 61.2 kg (135 lb)   SpO2 96%   BMI 21.79 kg/m²     Physical Exam  HENT:      Head: Normocephalic and atraumatic.      Right Ear: External ear normal.      Left Ear: External ear normal.   Eyes:      Conjunctiva/sclera: Conjunctivae normal.      Pupils: Pupils are equal, round, and reactive to light.   Cardiovascular:      Rate and Rhythm: Normal rate and regular rhythm.      Heart sounds: Normal heart sounds.   Pulmonary:      Effort: Pulmonary effort is normal.      Breath sounds: Normal breath sounds.   Abdominal:      General: Bowel sounds are normal.      Palpations: Abdomen is soft.   Musculoskeletal:         General: Normal range of motion.      Cervical back: Normal range of motion and neck supple.   Skin:     General: Skin is warm and dry.      Capillary Refill: Capillary refill takes less than 2 seconds.   Neurological:      Mental Status: She is alert and oriented to person, place, and time.   Psychiatric:         Behavior: Behavior normal.         Thought Content: Thought content normal.         Judgment: Judgment normal.     Jolanta was seen today for follow-up.  If the labs are ok we will try ssri and low dose steroids  Diagnoses and all orders for this visit:    Hyponatremia  -     Basic metabolic panel  Her last on sodium was 134  Aortic dilatation (CMS/HCC)  Noted in passing on a CAT scan  Thoracic aortic aneurysm without rupture, unspecified part  To dilatation not necessarily an aneurysm  Post covid-19 condition, unspecified  -     COVID- 19 PCR Symptomatic (includes FLU A/B & RSV) - [facility]  Has had very difficult time since COVID in the early December days  Primary insomnia  She has not not been able to sleep generally without a sleeping pill  Secondary hypertension  Her blood pressure has here and they have been noted to be quite elevated and she self medicates " with an extra hydralazine  Hyperlipidemia LDL goal <130  She has discontinued her Zetia could not tolerate statin  History of breast cancer  She has had no recurrence  Benign essential hypertension  Hydralazine is controlling her blood pressure mainly  COVID-19  She may have long COVID  Change in bowel habits  Bowels have not been normal  Lightheadedness  -     CBC and differential  Feels exhausted and lightheaded a lot of the time  Osteoporosis, unspecified osteoporosis type, unspecified pathological fracture presence    Muscle cramps  -     CK, Total  Muscles feet muscles and leg muscles are bothering her  Iron deficiency anemia, unspecified iron deficiency anemia type  -     Iron and TIBC  I suspect her anemia is secondary to blood draws that she has had 40-50 blood work tests in the last 6 weeks we will check her iron levels and she will try taking supplemental iron  Other fatigue  -     TSH  Thyroid has not been tested  Amenorrhea  -     FSH/LH  We will monitor pituitary function  Weakness  -     Cortisol  Feels that she is adrenally insufficient

## 2023-01-05 LAB
FLUAV RNA SPEC QL NAA+PROBE: NEGATIVE
FLUBV RNA SPEC QL NAA+PROBE: NEGATIVE
RSV RNA SPEC QL NAA+PROBE: NEGATIVE
SARS-COV-2 RNA RESP QL NAA+PROBE: NEGATIVE

## 2023-01-23 ENCOUNTER — OFFICE VISIT (OUTPATIENT)
Dept: INTERNAL MEDICINE | Facility: CLINIC | Age: 68
End: 2023-01-23
Payer: MEDICARE

## 2023-01-23 VITALS
SYSTOLIC BLOOD PRESSURE: 126 MMHG | WEIGHT: 133.2 LBS | HEIGHT: 66 IN | TEMPERATURE: 97.8 F | OXYGEN SATURATION: 99 % | BODY MASS INDEX: 21.41 KG/M2 | HEART RATE: 79 BPM | RESPIRATION RATE: 15 BRPM | DIASTOLIC BLOOD PRESSURE: 92 MMHG

## 2023-01-23 DIAGNOSIS — E78.5 HYPERLIPIDEMIA LDL GOAL <130: ICD-10-CM

## 2023-01-23 DIAGNOSIS — Z85.3 HISTORY OF BREAST CANCER: ICD-10-CM

## 2023-01-23 DIAGNOSIS — I15.9 SECONDARY HYPERTENSION: ICD-10-CM

## 2023-01-23 DIAGNOSIS — I77.819 AORTIC DILATATION (CMS/HCC): ICD-10-CM

## 2023-01-23 DIAGNOSIS — U09.9 POST COVID-19 CONDITION, UNSPECIFIED: Primary | ICD-10-CM

## 2023-01-23 DIAGNOSIS — C50.919 MALIGNANT NEOPLASM OF FEMALE BREAST, UNSPECIFIED ESTROGEN RECEPTOR STATUS, UNSPECIFIED LATERALITY, UNSPECIFIED SITE OF BREAST (CMS/HCC): ICD-10-CM

## 2023-01-23 DIAGNOSIS — E87.1 HYPONATREMIA: ICD-10-CM

## 2023-01-23 DIAGNOSIS — E55.9 VITAMIN D DEFICIENCY: ICD-10-CM

## 2023-01-23 DIAGNOSIS — I10 BENIGN ESSENTIAL HYPERTENSION: ICD-10-CM

## 2023-01-23 LAB
25(OH)D3 SERPL-MCNC: 23 NG/ML (ref 30–100)
ALBUMIN SERPL-MCNC: 4.4 G/DL (ref 3.4–5)
ALP SERPL-CCNC: 64 IU/L (ref 35–126)
ALT SERPL-CCNC: 11 IU/L (ref 11–54)
ANION GAP SERPL CALC-SCNC: 10 MEQ/L (ref 3–15)
AST SERPL-CCNC: 17 IU/L (ref 15–41)
BASOPHILS # BLD: 0.06 K/UL (ref 0.01–0.1)
BASOPHILS NFR BLD: 0.7 %
BILIRUB SERPL-MCNC: 0.3 MG/DL (ref 0.3–1.2)
BUN SERPL-MCNC: 16 MG/DL (ref 8–20)
CALCIUM SERPL-MCNC: 9.8 MG/DL (ref 8.9–10.3)
CHLORIDE SERPL-SCNC: 102 MEQ/L (ref 98–109)
CO2 SERPL-SCNC: 23 MEQ/L (ref 22–32)
CREAT SERPL-MCNC: 0.7 MG/DL (ref 0.6–1.1)
DIFFERENTIAL METHOD BLD: NORMAL
EOSINOPHIL # BLD: 0.3 K/UL (ref 0.04–0.36)
EOSINOPHIL NFR BLD: 3.7 %
ERYTHROCYTE [DISTWIDTH] IN BLOOD BY AUTOMATED COUNT: 12.7 % (ref 11.7–14.4)
GFR SERPL CREATININE-BSD FRML MDRD: >60 ML/MIN/1.73M*2
GLUCOSE SERPL-MCNC: 95 MG/DL (ref 70–99)
HCT VFR BLDCO AUTO: 37.3 % (ref 35–45)
HGB BLD-MCNC: 12.6 G/DL (ref 11.8–15.7)
IMM GRANULOCYTES # BLD AUTO: 0.03 K/UL (ref 0–0.08)
IMM GRANULOCYTES NFR BLD AUTO: 0.4 %
LYMPHOCYTES # BLD: 1.65 K/UL (ref 1.2–3.5)
LYMPHOCYTES NFR BLD: 20.1 %
MCH RBC QN AUTO: 30.6 PG (ref 28–33.2)
MCHC RBC AUTO-ENTMCNC: 33.8 G/DL (ref 32.2–35.5)
MCV RBC AUTO: 90.5 FL (ref 83–98)
MONOCYTES # BLD: 0.62 K/UL (ref 0.28–0.8)
MONOCYTES NFR BLD: 7.6 %
NEUTROPHILS # BLD: 5.54 K/UL (ref 1.7–7)
NEUTS SEG NFR BLD: 67.5 %
NRBC BLD-RTO: 0 %
PDW BLD AUTO: 10.3 FL (ref 9.4–12.3)
PLATELET # BLD AUTO: 308 K/UL (ref 150–369)
POTASSIUM SERPL-SCNC: 4.5 MEQ/L (ref 3.6–5.1)
PROT SERPL-MCNC: 6.3 G/DL (ref 6–8.2)
RBC # BLD AUTO: 4.12 M/UL (ref 3.93–5.22)
SODIUM SERPL-SCNC: 135 MEQ/L (ref 136–144)
WBC # BLD AUTO: 8.2 K/UL (ref 3.8–10.5)

## 2023-01-23 PROCEDURE — 80053 COMPREHEN METABOLIC PANEL: CPT | Performed by: INTERNAL MEDICINE

## 2023-01-23 PROCEDURE — G8755 DIAS BP > OR = 90: HCPCS | Performed by: INTERNAL MEDICINE

## 2023-01-23 PROCEDURE — 99214 OFFICE O/P EST MOD 30 MIN: CPT | Performed by: INTERNAL MEDICINE

## 2023-01-23 PROCEDURE — 82306 VITAMIN D 25 HYDROXY: CPT | Performed by: INTERNAL MEDICINE

## 2023-01-23 PROCEDURE — G8752 SYS BP LESS 140: HCPCS | Performed by: INTERNAL MEDICINE

## 2023-01-23 PROCEDURE — 85025 COMPLETE CBC W/AUTO DIFF WBC: CPT | Performed by: INTERNAL MEDICINE

## 2023-01-23 RX ORDER — VALSARTAN 160 MG/1
160 TABLET ORAL DAILY
COMMUNITY
Start: 2023-01-18 | End: 2023-09-19

## 2023-01-23 RX ORDER — ONDANSETRON 4 MG/1
4 TABLET, ORALLY DISINTEGRATING ORAL EVERY 8 HOURS PRN
Qty: 12 TABLET | Refills: 0 | Status: SHIPPED | OUTPATIENT
Start: 2023-01-23 | End: 2023-02-14

## 2023-01-23 RX ORDER — ESCITALOPRAM OXALATE 5 MG/1
5 TABLET ORAL DAILY
Qty: 20 TABLET | Refills: 0 | Status: SHIPPED | OUTPATIENT
Start: 2023-01-23 | End: 2023-02-07 | Stop reason: SDUPTHER

## 2023-01-23 RX ORDER — MELOXICAM 7.5 MG/1
7.5 TABLET ORAL DAILY
COMMUNITY
Start: 2023-01-23

## 2023-01-23 ASSESSMENT — ENCOUNTER SYMPTOMS
HEADACHES: 1
SLEEP DISTURBANCE: 1
NECK PAIN: 1
BACK PAIN: 1
CARDIOVASCULAR NEGATIVE: 1
FATIGUE: 1
NECK STIFFNESS: 1
ACTIVITY CHANGE: 1
HEMATOLOGIC/LYMPHATIC NEGATIVE: 1
ARTHRALGIAS: 1
SINUS PRESSURE: 1
APPETITE CHANGE: 1
NAUSEA: 1
NERVOUS/ANXIOUS: 1
RESPIRATORY NEGATIVE: 1
SINUS PAIN: 1
LIGHT-HEADEDNESS: 1
ENDOCRINE NEGATIVE: 1
MYALGIAS: 1

## 2023-01-23 NOTE — PROGRESS NOTES
"Chief Complaint   Patient presents with     Pre-Op Exam       Initial /80 (BP Location: Right arm, Patient Position: Chair, Cuff Size: Adult Regular)  Pulse 53  Temp 97.6  F (36.4  C) (Oral)  Ht 5' 6\" (1.676 m)  Wt 138 lb 1.6 oz (62.6 kg)  SpO2 97%  Breastfeeding? No  BMI 22.29 kg/m2 Estimated body mass index is 22.29 kg/(m^2) as calculated from the following:    Height as of this encounter: 5' 6\" (1.676 m).    Weight as of this encounter: 138 lb 1.6 oz (62.6 kg).  Medication Reconciliation: complete   GRACE Thompson      " Having terrible headaches and thinks maybe due to her valsartan which she is newly taking for about 5 days and she feels better to day than yesterday. The headache onset is about 40 minutes after she takes  The valsartan  And she does not take anything for this.  She will give it a fair trial.  Is an   of [place] and she stopped because she had too many children.  Will be seeing Dr Polanco about the low serum sodium  Still getting foot cramps.  Still has some nausea.  Some days her appetite is ok and other days has the nausea.    Her energy is not good certainly as it was.  Not as much feeling like lying down.  Is a little constipated and is taking fiber psyllium tabs and she used to be more having diarrhea.  Headache comes with some nausea.  Has always felt a bit rotten but like her bones are coming out of her skin and she has a constant feeling of her body like this.      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   • Aortic dilatation (CMS/HCC) 09/07/2022   • Hyponatremia 09/12/2022   • Benign essential hypertension 05/27/2020   • Thoracic aortic aneurysm without rupture 05/13/2022   • Hyponatremia 12/16/2022   • Post covid-19 condition, unspecified 12/16/2022   • Lightheadedness 12/23/2022   • COVID-19 12/01/2022   • Muscle cramps 01/04/2023     Resolved Ambulatory Problems     Diagnosis Date Noted   • Breast cancer (CMS/HCC)      Past Medical History:   Diagnosis Date   • Lipid disorder      Past Surgical History:   Procedure Laterality Date   • BREAST LUMPECTOMY     • HYSTERECTOMY       Allergies   Allergen Reactions   • Flaxseed Angioedema     Eyes swell, throat itches   • Atorvastatin      Other reaction(s): myalgias, headache   • Cat Dander    •  "Rosuvastatin      Note: myalgias     Immunization History   Administered Date(s) Administered   • Influenza Vaccine High Dose 65 And Older 09/16/2021   • Influenza, Unspecified 11/13/2007, 10/15/2008, 10/08/2013, 10/07/2014, 11/02/2015, 10/10/2016, 10/03/2017, 09/28/2018, 09/16/2021   • Pneumococcal Conjugate 13-Valent 08/05/2020   • Pneumococcal Polysaccharide 11/18/2021   • SARS-COV-2 (COVID-19) VACCINE, MODERNA 01/22/2021, 02/23/2021, 09/09/2021, 04/15/2022   • Tdap 06/01/2016   • Zoster 11/02/2015     Current Outpatient Medications on File Prior to Visit   Medication Sig Dispense Refill   • famotidine (PEPCID) 20 mg tablet Take 1 tablet (20 mg total) by mouth 2 (two) times a day for 10 days. 20 tablet 0   • meloxicam (MOBIC) 7.5 mg tablet Take 1 tablet (7.5 mg total) by mouth daily.     • valsartan (DIOVAN) 160 mg tablet Take 160 mg by mouth daily.     • zolpidem CR (AMBIEN CR) 6.25 mg CR tablet TAKE ONE TABLET BY MOUTH EVERY EVENING AS NEEDED FOR SLEEP       No current facility-administered medications on file prior to visit.       Review of Systems   Constitutional: Positive for activity change, appetite change and fatigue.   HENT: Positive for sinus pressure and sinus pain.    Respiratory: Negative.    Cardiovascular: Negative.    Gastrointestinal: Positive for nausea.   Endocrine: Negative.    Genitourinary: Negative.    Musculoskeletal: Positive for arthralgias, back pain, myalgias, neck pain and neck stiffness.   Skin: Negative.    Allergic/Immunologic: Positive for environmental allergies and food allergies.   Neurological: Positive for light-headedness and headaches.   Hematological: Negative.    Psychiatric/Behavioral: Positive for sleep disturbance. The patient is nervous/anxious.    All other systems reviewed and are negative.        Visit Vitals  BP (!) 126/92 (BP Location: Right upper arm, Patient Position: Sitting)   Pulse 79   Temp 36.6 °C (97.8 °F) (Temporal)   Resp 15   Ht 1.676 m (5' 6\") " Comment: Pt Stated   Wt 60.4 kg (133 lb 3.2 oz) Comment: With Shoes   SpO2 99%   BMI 21.50 kg/m²     Physical Exam  HENT:      Head: Normocephalic and atraumatic.      Right Ear: External ear normal.      Left Ear: External ear normal.   Eyes:      Conjunctiva/sclera: Conjunctivae normal.      Pupils: Pupils are equal, round, and reactive to light.   Cardiovascular:      Rate and Rhythm: Normal rate and regular rhythm.      Heart sounds: Normal heart sounds.   Pulmonary:      Effort: Pulmonary effort is normal.      Breath sounds: Normal breath sounds.   Abdominal:      General: Bowel sounds are normal.      Palpations: Abdomen is soft.   Musculoskeletal:         General: Normal range of motion.      Cervical back: Normal range of motion and neck supple.   Skin:     General: Skin is warm and dry.      Capillary Refill: Capillary refill takes less than 2 seconds.   Neurological:      Mental Status: She is alert and oriented to person, place, and time.   Psychiatric:         Behavior: Behavior normal.         Thought Content: Thought content normal.         Judgment: Judgment normal.     Jolanta was seen today for follow-up.    Diagnoses and all orders for this visit:    Post covid-19 condition, unspecified    Aortic dilatation (CMS/HCC)    Hyponatremia    Benign essential hypertension    Malignant neoplasm of female breast, unspecified estrogen receptor status, unspecified laterality, unspecified site of breast (CMS/HCC)    History of breast cancer    Secondary hypertension    Hyperlipidemia LDL goal <130    Other orders  -     ondansetron ODT (ZOFRAN-ODT) 4 mg disintegrating tablet; Take 1 tablet (4 mg total) by mouth every 8 (eight) hours as needed for nausea or vomiting.  -     escitalopram (LEXAPRO) 5 mg tablet; Take 1 tablet (5 mg total) by mouth daily for 20 days.

## 2023-02-07 RX ORDER — ZOLPIDEM TARTRATE 6.25 MG/1
TABLET, FILM COATED, EXTENDED RELEASE ORAL
Qty: 30 TABLET | Refills: 3 | Status: SHIPPED | OUTPATIENT
Start: 2023-02-07 | End: 2023-07-12 | Stop reason: SDUPTHER

## 2023-02-07 RX ORDER — ESCITALOPRAM OXALATE 5 MG/1
5 TABLET ORAL DAILY
Qty: 90 TABLET | Refills: 1 | Status: SHIPPED | OUTPATIENT
Start: 2023-02-07 | End: 2023-07-12 | Stop reason: SDUPTHER

## 2023-02-13 RX ORDER — AMLODIPINE BESYLATE 2.5 MG/1
5 TABLET ORAL DAILY
COMMUNITY
Start: 2023-01-25

## 2023-02-14 ENCOUNTER — OFFICE VISIT (OUTPATIENT)
Dept: INTERNAL MEDICINE | Facility: CLINIC | Age: 68
End: 2023-02-14
Payer: MEDICARE

## 2023-02-14 VITALS
HEART RATE: 84 BPM | HEIGHT: 66 IN | SYSTOLIC BLOOD PRESSURE: 120 MMHG | RESPIRATION RATE: 16 BRPM | TEMPERATURE: 98 F | WEIGHT: 134 LBS | BODY MASS INDEX: 21.53 KG/M2 | OXYGEN SATURATION: 97 % | DIASTOLIC BLOOD PRESSURE: 78 MMHG

## 2023-02-14 DIAGNOSIS — F51.01 PRIMARY INSOMNIA: ICD-10-CM

## 2023-02-14 DIAGNOSIS — E22.2 SYNDROME OF INAPPROPRIATE SECRETION OF ANTIDIURETIC HORMONE (CMS/HCC): ICD-10-CM

## 2023-02-14 DIAGNOSIS — I77.819 AORTIC DILATATION (CMS/HCC): ICD-10-CM

## 2023-02-14 DIAGNOSIS — I10 BENIGN ESSENTIAL HYPERTENSION: ICD-10-CM

## 2023-02-14 DIAGNOSIS — U09.9 POST COVID-19 CONDITION, UNSPECIFIED: ICD-10-CM

## 2023-02-14 DIAGNOSIS — E87.1 HYPONATREMIA: ICD-10-CM

## 2023-02-14 DIAGNOSIS — E78.5 HYPERLIPIDEMIA LDL GOAL <130: ICD-10-CM

## 2023-02-14 DIAGNOSIS — Z85.3 HISTORY OF BREAST CANCER: ICD-10-CM

## 2023-02-14 PROBLEM — M19.90 OSTEOARTHRITIS: Status: ACTIVE | Noted: 2023-01-31

## 2023-02-14 PROCEDURE — 99214 OFFICE O/P EST MOD 30 MIN: CPT | Performed by: INTERNAL MEDICINE

## 2023-02-14 PROCEDURE — G8754 DIAS BP LESS 90: HCPCS | Performed by: INTERNAL MEDICINE

## 2023-02-14 PROCEDURE — G8752 SYS BP LESS 140: HCPCS | Performed by: INTERNAL MEDICINE

## 2023-02-14 RX ORDER — ELETRIPTAN HYDROBROMIDE 40 MG/1
40 TABLET, FILM COATED ORAL ONCE AS NEEDED
Qty: 6 TABLET | Refills: 0 | Status: SHIPPED | OUTPATIENT
Start: 2023-02-14 | End: 2023-03-16

## 2023-02-14 ASSESSMENT — ENCOUNTER SYMPTOMS
CARDIOVASCULAR NEGATIVE: 1
ARTHRALGIAS: 1
SLEEP DISTURBANCE: 1
FATIGUE: 1
ENDOCRINE NEGATIVE: 1
NERVOUS/ANXIOUS: 1
MYALGIAS: 1
RESPIRATORY NEGATIVE: 1
SINUS PAIN: 1
NECK STIFFNESS: 1
HEMATOLOGIC/LYMPHATIC NEGATIVE: 1
NECK PAIN: 1
HEADACHES: 1
BACK PAIN: 1
GASTROINTESTINAL NEGATIVE: 1

## 2023-02-14 NOTE — PROGRESS NOTES
Headaches are one third of the time and are severe and she is only taking tylenol.. The renal consult has her on fluid restriction and urea  And had labs today at lab [facility] and I cannot access them.  She is feeling better since she had COVID and the Lexapro is not giving her brain any trouble.  Her remaining concern is the headache issue and stopping her hydralazine and using different medications for blood pressure control have helped how she feels.  He is not as short of breath that she was and she has still some feet cramping and is able to sleep better.    Her aunt has passed away and so she will be going to [place] where her sister will join her while they settle her aunt's estate.    She needs to take a headache med and we will start with a migraine rx.  She is allowed to continue the lexapro.          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   • Aortic dilatation (CMS/HCC) 09/07/2022   • Hyponatremia 09/12/2022   • Benign essential hypertension 05/27/2020   • Thoracic aortic aneurysm without rupture 05/13/2022   • Hyponatremia 12/16/2022   • Post covid-19 condition, unspecified 12/16/2022   • Lightheadedness 12/23/2022   • COVID-19 12/01/2022   • Muscle cramps 01/04/2023     Resolved Ambulatory Problems     Diagnosis Date Noted   • Breast cancer (CMS/HCC)      Past Medical History:   Diagnosis Date   • Lipid disorder      Past Surgical History:   Procedure Laterality Date   • BREAST LUMPECTOMY     • HYSTERECTOMY       Allergies   Allergen Reactions   • Flaxseed Angioedema     Eyes swell, throat itches   • Atorvastatin      Other reaction(s): myalgias, headache   • Cat Dander    • Rosuvastatin      Note: myalgias     Current Outpatient Medications on File Prior  "to Visit   Medication Sig Dispense Refill   • amLODIPine (NORVASC) 2.5 mg tablet Take 5 mg by mouth daily.     • escitalopram (LEXAPRO) 5 mg tablet Take 1 tablet (5 mg total) by mouth daily. 90 tablet 1   • meloxicam (MOBIC) 7.5 mg tablet Take 1 tablet (7.5 mg total) by mouth daily.     • ondansetron ODT (ZOFRAN-ODT) 4 mg disintegrating tablet Take 1 tablet (4 mg total) by mouth every 8 (eight) hours as needed for nausea or vomiting. 12 tablet 0   • valsartan (DIOVAN) 160 mg tablet Take 160 mg by mouth daily.     • zolpidem CR (AMBIEN CR) 6.25 mg CR tablet TAKE ONE TABLET BY MOUTH EVERY EVENING AS NEEDED FOR SLEEP Strength: 6.25 mg 30 tablet 3   • famotidine (PEPCID) 20 mg tablet Take 1 tablet (20 mg total) by mouth 2 (two) times a day for 10 days. 20 tablet 0     No current facility-administered medications on file prior to visit.     Review of Systems   Constitutional: Positive for fatigue.   HENT: Positive for sinus pain.    Respiratory: Negative.    Cardiovascular: Negative.    Gastrointestinal: Negative.    Endocrine: Negative.    Genitourinary: Negative.    Musculoskeletal: Positive for arthralgias, back pain, myalgias, neck pain and neck stiffness.   Skin: Negative.    Allergic/Immunologic: Positive for environmental allergies and food allergies.   Neurological: Positive for headaches.   Hematological: Negative.    Psychiatric/Behavioral: Positive for sleep disturbance. The patient is nervous/anxious.    All other systems reviewed and are negative.        Visit Vitals  /78 (BP Location: Left upper arm, Patient Position: Sitting)   Pulse 84   Temp 36.7 °C (98 °F) (Temporal)   Resp 16   Ht 1.676 m (5' 6\")   Wt 60.8 kg (134 lb)   SpO2 97%   BMI 21.63 kg/m²     Physical Exam  HENT:      Head: Normocephalic and atraumatic.      Right Ear: External ear normal.      Left Ear: External ear normal.   Eyes:      Conjunctiva/sclera: Conjunctivae normal.      Pupils: Pupils are equal, round, and reactive to light. "   Cardiovascular:      Rate and Rhythm: Normal rate and regular rhythm.      Heart sounds: Normal heart sounds.   Pulmonary:      Effort: Pulmonary effort is normal.      Breath sounds: Normal breath sounds.   Abdominal:      General: Bowel sounds are normal.      Palpations: Abdomen is soft.   Musculoskeletal:         General: Normal range of motion.      Cervical back: Normal range of motion and neck supple.   Skin:     General: Skin is warm and dry.      Capillary Refill: Capillary refill takes less than 2 seconds.   Neurological:      Mental Status: She is alert and oriented to person, place, and time.   Psychiatric:         Behavior: Behavior normal.         Thought Content: Thought content normal.         Judgment: Judgment normal.       Jolanta was seen today for follow-up.    Diagnoses and all orders for this visit:    Aortic dilatation (CMS/HCC)  This is an asymptomatic finding on imaging  Benign essential hypertension  Blood pressure is excellent  History of breast cancer  She has had no recurrence  Hyperlipidemia LDL goal <130  She has not required statin therapy  Hyponatremia  He has seen nephrologist and the fluid restriction is in place.  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 May repeat in 2 hours if unresolved. Do not exceed 80 mg in 24 hours.

## 2023-02-28 ENCOUNTER — APPOINTMENT (OUTPATIENT)
Dept: LAB | Facility: HOSPITAL | Age: 68
End: 2023-02-28
Attending: STUDENT IN AN ORGANIZED HEALTH CARE EDUCATION/TRAINING PROGRAM
Payer: MEDICARE

## 2023-02-28 ENCOUNTER — TRANSCRIBE ORDERS (OUTPATIENT)
Dept: LAB | Facility: HOSPITAL | Age: 68
End: 2023-02-28

## 2023-02-28 DIAGNOSIS — E22.2 SYNDROME OF INAPPROPRIATE SECRETION OF ANTIDIURETIC HORMONE (CMS/HCC): Primary | ICD-10-CM

## 2023-02-28 DIAGNOSIS — E22.2 SYNDROME OF INAPPROPRIATE SECRETION OF ANTIDIURETIC HORMONE (CMS/HCC): ICD-10-CM

## 2023-02-28 LAB
ANION GAP SERPL CALC-SCNC: 9 MEQ/L (ref 3–15)
BUN SERPL-MCNC: 20 MG/DL (ref 8–20)
CALCIUM SERPL-MCNC: 10 MG/DL (ref 8.9–10.3)
CHLORIDE SERPL-SCNC: 104 MEQ/L (ref 98–109)
CO2 SERPL-SCNC: 25 MEQ/L (ref 22–32)
CREAT SERPL-MCNC: 0.7 MG/DL (ref 0.6–1.1)
GFR SERPL CREATININE-BSD FRML MDRD: >60 ML/MIN/1.73M*2
GLUCOSE SERPL-MCNC: 93 MG/DL (ref 70–99)
OSMOLALITY SERPL: 289 MOSM/KG (ref 280–300)
OSMOLALITY UR: 783 MOSM/KG (ref 100–1400)
POTASSIUM SERPL-SCNC: 4.1 MEQ/L (ref 3.6–5.1)
SODIUM SERPL-SCNC: 138 MEQ/L (ref 136–144)
SODIUM UR-SCNC: 86 MEQ/L

## 2023-02-28 PROCEDURE — 83930 ASSAY OF BLOOD OSMOLALITY: CPT

## 2023-02-28 PROCEDURE — 80048 BASIC METABOLIC PNL TOTAL CA: CPT

## 2023-02-28 PROCEDURE — 84300 ASSAY OF URINE SODIUM: CPT

## 2023-02-28 PROCEDURE — 36415 COLL VENOUS BLD VENIPUNCTURE: CPT

## 2023-02-28 PROCEDURE — 83935 ASSAY OF URINE OSMOLALITY: CPT

## 2023-05-18 ENCOUNTER — TRANSCRIBE ORDERS (OUTPATIENT)
Dept: LAB | Facility: HOSPITAL | Age: 68
End: 2023-05-18

## 2023-05-18 ENCOUNTER — APPOINTMENT (OUTPATIENT)
Dept: LAB | Facility: HOSPITAL | Age: 68
End: 2023-05-18
Attending: INTERNAL MEDICINE
Payer: MEDICARE

## 2023-05-18 DIAGNOSIS — E78.5 HYPERLIPIDEMIA, UNSPECIFIED: Primary | ICD-10-CM

## 2023-05-18 DIAGNOSIS — E22.2 SYNDROME OF INAPPROPRIATE SECRETION OF ANTIDIURETIC HORMONE (CMS/HCC): ICD-10-CM

## 2023-05-18 DIAGNOSIS — E55.9 VITAMIN D DEFICIENCY, UNSPECIFIED: ICD-10-CM

## 2023-05-18 DIAGNOSIS — E78.5 HYPERLIPIDEMIA, UNSPECIFIED: ICD-10-CM

## 2023-05-18 LAB
25(OH)D3 SERPL-MCNC: 46 NG/ML (ref 30–100)
ALBUMIN SERPL-MCNC: 4.2 G/DL (ref 3.4–5)
ALP SERPL-CCNC: 71 IU/L (ref 35–126)
ALT SERPL-CCNC: 17 IU/L (ref 11–54)
ANION GAP SERPL CALC-SCNC: 10 MEQ/L (ref 3–15)
AST SERPL-CCNC: 21 IU/L (ref 15–41)
BILIRUB SERPL-MCNC: 1 MG/DL (ref 0.3–1.2)
BUN SERPL-MCNC: 19 MG/DL (ref 8–20)
CALCIUM SERPL-MCNC: 9.7 MG/DL (ref 8.9–10.3)
CHLORIDE SERPL-SCNC: 101 MEQ/L (ref 98–109)
CHOLEST SERPL-MCNC: 244 MG/DL
CO2 SERPL-SCNC: 25 MEQ/L (ref 22–32)
CREAT SERPL-MCNC: 0.8 MG/DL (ref 0.6–1.1)
GFR SERPL CREATININE-BSD FRML MDRD: >60 ML/MIN/1.73M*2
GLUCOSE SERPL-MCNC: 91 MG/DL (ref 70–99)
HDLC SERPL-MCNC: 68 MG/DL
HDLC SERPL: 3.6 {RATIO}
LDLC SERPL CALC-MCNC: 164 MG/DL
NONHDLC SERPL-MCNC: 176 MG/DL
OSMOLALITY UR: 445 MOSM/KG (ref 100–1400)
POTASSIUM SERPL-SCNC: 4.4 MEQ/L (ref 3.6–5.1)
PROT SERPL-MCNC: 6.1 G/DL (ref 6–8.2)
SODIUM SERPL-SCNC: 136 MEQ/L (ref 136–144)
TRIGL SERPL-MCNC: 62 MG/DL (ref 30–149)

## 2023-05-18 PROCEDURE — 83935 ASSAY OF URINE OSMOLALITY: CPT

## 2023-05-18 PROCEDURE — 36415 COLL VENOUS BLD VENIPUNCTURE: CPT

## 2023-05-18 PROCEDURE — 82306 VITAMIN D 25 HYDROXY: CPT

## 2023-05-18 PROCEDURE — 80053 COMPREHEN METABOLIC PANEL: CPT

## 2023-05-18 PROCEDURE — 80061 LIPID PANEL: CPT

## 2023-07-12 RX ORDER — ESCITALOPRAM OXALATE 5 MG/1
5 TABLET ORAL DAILY
Qty: 90 TABLET | Refills: 1 | Status: SHIPPED | OUTPATIENT
Start: 2023-07-12 | End: 2024-01-02 | Stop reason: SDUPTHER

## 2023-07-12 NOTE — TELEPHONE ENCOUNTER
Medicine Refill Request    Last Office: 2/14/2023   Last Consult Visit: Visit date not found  Last Telemedicine Visit: Visit date not found    Next Appointment: 9/12/2023      Current Outpatient Medications:     escitalopram (LEXAPRO) 5 mg tablet, Take 1 tablet (5 mg total) by mouth daily., Disp: 90 tablet, Rfl: 1    amLODIPine (NORVASC) 2.5 mg tablet, Take 5 mg by mouth daily., Disp: , Rfl:     eletriptan (RELPAX) 40 mg tablet, Take 1 tablet (40 mg total) by mouth once as needed for migraine. May repeat in 2 hours if unresolved. Do not exceed 80 mg in 24 hours., Disp: 6 tablet, Rfl: 0    meloxicam (MOBIC) 7.5 mg tablet, Take 1 tablet (7.5 mg total) by mouth daily., Disp: , Rfl:     valsartan (DIOVAN) 160 mg tablet, Take 160 mg by mouth daily., Disp: , Rfl:     zolpidem CR (AMBIEN CR) 6.25 mg CR tablet, TAKE ONE TABLET BY MOUTH EVERY EVENING AS NEEDED FOR SLEEP Strength: 6.25 mg, Disp: 30 tablet, Rfl: 3      BP Readings from Last 3 Encounters:   02/14/23 120/78   01/23/23 (!) 126/92   01/04/23 124/84       Recent Lab results:  Lab Results   Component Value Date    CHOL 244 (H) 05/18/2023   ,   Lab Results   Component Value Date    HDL 68 05/18/2023   ,   Lab Results   Component Value Date    LDLCALC 164 (H) 05/18/2023   ,   Lab Results   Component Value Date    TRIG 62 05/18/2023        Lab Results   Component Value Date    GLUCOSE 91 05/18/2023   , No results found for: HGBA1C      Lab Results   Component Value Date    CREATININE 0.8 05/18/2023       Lab Results   Component Value Date    TSH 0.98 01/04/2023

## 2023-07-13 RX ORDER — ZOLPIDEM TARTRATE 6.25 MG/1
6.25 TABLET, FILM COATED, EXTENDED RELEASE ORAL NIGHTLY PRN
Qty: 30 TABLET | Refills: 3 | Status: SHIPPED | OUTPATIENT
Start: 2023-07-13 | End: 2023-09-20 | Stop reason: SDUPTHER

## 2023-09-19 PROBLEM — G56.00 CARPAL TUNNEL SYNDROME: Status: ACTIVE | Noted: 2023-09-19

## 2023-09-19 RX ORDER — LISINOPRIL 40 MG/1
40 TABLET ORAL DAILY
COMMUNITY
Start: 2023-08-12 | End: 2023-09-20

## 2023-09-19 RX ORDER — ACETAMINOPHEN 500 MG
TABLET ORAL
COMMUNITY
Start: 2023-05-16

## 2023-09-19 RX ORDER — VALSARTAN 80 MG/1
TABLET ORAL
COMMUNITY

## 2023-09-20 ENCOUNTER — CONSULT (OUTPATIENT)
Dept: INTERNAL MEDICINE | Facility: CLINIC | Age: 68
End: 2023-09-20
Payer: MEDICARE

## 2023-09-20 VITALS
OXYGEN SATURATION: 97 % | DIASTOLIC BLOOD PRESSURE: 94 MMHG | HEIGHT: 66 IN | HEART RATE: 67 BPM | RESPIRATION RATE: 17 BRPM | SYSTOLIC BLOOD PRESSURE: 128 MMHG | WEIGHT: 141 LBS | BODY MASS INDEX: 22.66 KG/M2

## 2023-09-20 DIAGNOSIS — I15.9 SECONDARY HYPERTENSION: ICD-10-CM

## 2023-09-20 DIAGNOSIS — E22.2 SYNDROME OF INAPPROPRIATE SECRETION OF ANTIDIURETIC HORMONE (CMS/HCC): ICD-10-CM

## 2023-09-20 DIAGNOSIS — E87.1 HYPONATREMIA: ICD-10-CM

## 2023-09-20 DIAGNOSIS — E78.5 HYPERLIPIDEMIA LDL GOAL <130: ICD-10-CM

## 2023-09-20 DIAGNOSIS — I10 BENIGN ESSENTIAL HYPERTENSION: Primary | ICD-10-CM

## 2023-09-20 DIAGNOSIS — U09.9 POST COVID-19 CONDITION, UNSPECIFIED: ICD-10-CM

## 2023-09-20 DIAGNOSIS — G56.00 CARPAL TUNNEL SYNDROME, UNSPECIFIED LATERALITY: ICD-10-CM

## 2023-09-20 DIAGNOSIS — I71.20 THORACIC AORTIC ANEURYSM WITHOUT RUPTURE, UNSPECIFIED PART (CMS/HCC): ICD-10-CM

## 2023-09-20 DIAGNOSIS — I77.819 AORTIC DILATATION (CMS/HCC): ICD-10-CM

## 2023-09-20 PROCEDURE — G8755 DIAS BP > OR = 90: HCPCS | Performed by: INTERNAL MEDICINE

## 2023-09-20 PROCEDURE — 99214 OFFICE O/P EST MOD 30 MIN: CPT | Performed by: INTERNAL MEDICINE

## 2023-09-20 PROCEDURE — G8752 SYS BP LESS 140: HCPCS | Performed by: INTERNAL MEDICINE

## 2023-09-20 RX ORDER — ATORVASTATIN CALCIUM 20 MG/1
TABLET, FILM COATED ORAL DAILY
COMMUNITY
Start: 2023-05-19 | End: 2023-09-20

## 2023-09-20 RX ORDER — EZETIMIBE 10 MG/1
TABLET ORAL
COMMUNITY
Start: 2023-05-23 | End: 2023-09-20

## 2023-09-20 RX ORDER — ZOLPIDEM TARTRATE 6.25 MG/1
6.25 TABLET, FILM COATED, EXTENDED RELEASE ORAL NIGHTLY PRN
Qty: 90 TABLET | Refills: 3 | Status: SHIPPED | OUTPATIENT
Start: 2023-09-20 | End: 2024-04-12

## 2023-09-20 ASSESSMENT — ENCOUNTER SYMPTOMS
NUMBNESS: 1
HEMATOLOGIC/LYMPHATIC NEGATIVE: 1
NECK PAIN: 1
RESPIRATORY NEGATIVE: 1
ENDOCRINE NEGATIVE: 1
CARDIOVASCULAR NEGATIVE: 1
GASTROINTESTINAL NEGATIVE: 1
SLEEP DISTURBANCE: 1
ARTHRALGIAS: 1
BACK PAIN: 1
UNEXPECTED WEIGHT CHANGE: 1
NERVOUS/ANXIOUS: 1
NECK STIFFNESS: 1

## 2023-09-20 NOTE — PROGRESS NOTES
Pre op exam for carpel tunnel syndrome: will have surgery next week. Is still taking her lexapro and has been generally much better since her bout with Covid earlier this year.  Has not been taking the zetia because it gave her ocular migraine    Checks her BP on her own.  Has not been taking the cholesterol meds at all.  Her only complaints is that her gut is getting bigger and she is exercising more.    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    Aortic dilatation (CMS/HCC) 09/07/2022    Hyponatremia 09/12/2022    Benign essential hypertension 05/27/2020    Thoracic aortic aneurysm without rupture (CMS/HCC) 05/13/2022    Hyponatremia 12/16/2022    Post covid-19 condition, unspecified 12/16/2022    Lightheadedness 12/23/2022    COVID-19 12/01/2022    Muscle cramps 01/04/2023    Osteoarthritis 01/31/2023    Syndrome of inappropriate secretion of antidiuretic hormone (CMS/HCC) 01/31/2023    Carpal tunnel syndrome 09/19/2023     Resolved Ambulatory Problems     Diagnosis Date Noted    Breast cancer (CMS/HCC)      Past Medical History:   Diagnosis Date    Lipid disorder      Past Surgical History:   Procedure Laterality Date    BREAST LUMPECTOMY      HYSTERECTOMY       Allergies   Allergen Reactions    Flaxseed Angioedema     Eyes swell, throat itches    Atorvastatin      Other reaction(s): myalgias, headache    Cat Dander     Rosuvastatin      Note: myalgias     Current Outpatient Medications on File Prior to Visit   Medication Sig Dispense Refill    amLODIPine (NORVASC) 2.5 mg tablet Take 5 mg by mouth daily.      cholecalciferol, vitamin D3, (VITAMIN D3) 50 mcg (2,000 unit) capsule Vitamin D 50 MCG  "(2000 UT) Oral Capsule QTY: 30 capsule Days: 30 Refills: 11  Written: 05/16/23 Patient Instructions: 1 tablet po daily      escitalopram (LEXAPRO) 5 mg tablet Take 1 tablet (5 mg total) by mouth daily. 90 tablet 1    meloxicam (MOBIC) 7.5 mg tablet Take 1 tablet (7.5 mg total) by mouth daily.      valsartan (DIOVAN) 80 mg tablet       eletriptan (RELPAX) 40 mg tablet Take 1 tablet (40 mg total) by mouth once as needed for migraine. May repeat in 2 hours if unresolved. Do not exceed 80 mg in 24 hours. 6 tablet 0     No current facility-administered medications on file prior to visit.         Review of Systems   Constitutional: Positive for unexpected weight change.   HENT: Negative.    Respiratory: Negative.    Cardiovascular: Negative.    Gastrointestinal: Negative.    Endocrine: Negative.    Genitourinary: Negative.    Musculoskeletal: Positive for arthralgias, back pain, neck pain and neck stiffness.   Skin: Negative.    Allergic/Immunologic: Positive for environmental allergies and food allergies.   Neurological: Positive for numbness.   Hematological: Negative.    Psychiatric/Behavioral: Positive for sleep disturbance. The patient is nervous/anxious.    All other systems reviewed and are negative.      Visit Vitals  BP (!) 128/94   Pulse 67   Resp 17   Ht 1.676 m (5' 6\")   Wt 64 kg (141 lb)   SpO2 97%   BMI 22.76 kg/m²     Physical Exam  HENT:      Head: Normocephalic and atraumatic.      Right Ear: External ear normal.      Left Ear: External ear normal.   Eyes:      Conjunctiva/sclera: Conjunctivae normal.      Pupils: Pupils are equal, round, and reactive to light.   Cardiovascular:      Rate and Rhythm: Normal rate and regular rhythm.      Heart sounds: Normal heart sounds.   Pulmonary:      Effort: Pulmonary effort is normal.      Breath sounds: Normal breath sounds.   Chest:   Breasts:     Right: Normal.      Left: Normal.   Abdominal:      General: Bowel sounds are normal.      Palpations: Abdomen is " soft.   Musculoskeletal:         General: Normal range of motion.      Cervical back: Normal range of motion and neck supple.   Skin:     General: Skin is warm and dry.      Capillary Refill: Capillary refill takes less than 2 seconds.   Neurological:      Mental Status: She is alert and oriented to person, place, and time.   Psychiatric:         Behavior: Behavior normal.         Thought Content: Thought content normal.         Judgment: Judgment normal.       Jolanta was seen today for pre-op exam.    Diagnoses and all orders for this visit:    Benign essential hypertension  Continue amlodipine and valsartan  Aortic dilatation (CMS/HCC)  Followed by Dr Aguilar  Carpal tunnel syndrome, unspecified laterality  Ok to have her carpal tunnel fixed  Hyponatremia  This has improved and she no longer takes urea  Secondary hypertension   this has resolved  Hyperlipidemia LDL goal <130  She does ot take meds for this  Post covid-19 condition, unspecified  She has not fully recovered  Syndrome of inappropriate secretion of antidiuretic hormone (CMS/HCC)  This was induced by covid   Thoracic aortic aneurysm without rupture, unspecified part (CMS/HCC)  Followed by cardiology  Other orders  -     zolpidem CR (AMBIEN CR) 6.25 mg CR tablet; Take 1 tablet (6.25 mg total) by mouth nightly as needed for sleep. TAKE ONE TABLET BY MOUTH EVERY EVENING AS NEEDED FOR SLEEP Strength: 6.25 mg

## 2024-01-02 RX ORDER — ESCITALOPRAM OXALATE 5 MG/1
5 TABLET ORAL DAILY
Qty: 90 TABLET | Refills: 1 | Status: SHIPPED | OUTPATIENT
Start: 2024-01-02 | End: 2024-07-08

## 2024-04-12 RX ORDER — ZOLPIDEM TARTRATE 6.25 MG/1
6.25 TABLET, FILM COATED, EXTENDED RELEASE ORAL NIGHTLY PRN
Qty: 90 TABLET | Refills: 3 | Status: SHIPPED | OUTPATIENT
Start: 2024-04-12 | End: 2024-05-03

## 2024-04-18 RX ORDER — ZOLPIDEM TARTRATE 6.25 MG/1
TABLET, FILM COATED, EXTENDED RELEASE ORAL
Qty: 30 TABLET | Refills: 3 | OUTPATIENT
Start: 2024-04-18

## 2024-04-19 RX ORDER — ZOLPIDEM TARTRATE 6.25 MG/1
TABLET, FILM COATED, EXTENDED RELEASE ORAL
Qty: 30 TABLET | Refills: 3 | OUTPATIENT
Start: 2024-04-19

## 2024-05-03 RX ORDER — ZOLPIDEM TARTRATE 6.25 MG/1
12.5 TABLET, FILM COATED, EXTENDED RELEASE ORAL NIGHTLY PRN
Qty: 30 TABLET | Refills: 3 | Status: SHIPPED | OUTPATIENT
Start: 2024-05-03 | End: 2024-11-18

## 2024-05-03 NOTE — TELEPHONE ENCOUNTER
"Medicine Refill Request    Last Office Visit: 2/14/2023   Last Consult Visit: 9/20/2023  Last Telemedicine Visit: Visit date not found    Next Appointment: Visit date not found      Current Outpatient Medications:     amLODIPine (NORVASC) 2.5 mg tablet, Take 5 mg by mouth daily., Disp: , Rfl:     cholecalciferol, vitamin D3, (VITAMIN D3) 50 mcg (2,000 unit) capsule, Vitamin D 50 MCG (2000 UT) Oral Capsule QTY: 30 capsule Days: 30 Refills: 11  Written: 05/16/23 Patient Instructions: 1 tablet po daily, Disp: , Rfl:     eletriptan (RELPAX) 40 mg tablet, Take 1 tablet (40 mg total) by mouth once as needed for migraine. May repeat in 2 hours if unresolved. Do not exceed 80 mg in 24 hours., Disp: 6 tablet, Rfl: 0    escitalopram (LEXAPRO) 5 mg tablet, TAKE ONE TABLET BY MOUTH EVERY DAY, Disp: 90 tablet, Rfl: 1    meloxicam (MOBIC) 7.5 mg tablet, Take 1 tablet (7.5 mg total) by mouth daily., Disp: , Rfl:     valsartan (DIOVAN) 80 mg tablet, , Disp: , Rfl:     zolpidem CR (AMBIEN CR) 6.25 mg CR tablet, Take 1 tablet (6.25 mg total) by mouth nightly as needed for sleep., Disp: 90 tablet, Rfl: 3      BP Readings from Last 3 Encounters:   09/20/23 (!) 128/94   02/14/23 120/78   01/23/23 (!) 126/92       Recent Lab results:  Lab Results   Component Value Date    CHOL 244 (H) 05/18/2023   ,   Lab Results   Component Value Date    HDL 68 05/18/2023   ,   Lab Results   Component Value Date    LDLCALC 164 (H) 05/18/2023   ,   Lab Results   Component Value Date    TRIG 62 05/18/2023        Lab Results   Component Value Date    GLUCOSE 91 05/18/2023   , No results found for: \"HGBA1C\"      Lab Results   Component Value Date    CREATININE 0.8 05/18/2023       Lab Results   Component Value Date    TSH 0.98 01/04/2023         No results found for: \"HGBA1C\"    "

## 2024-07-08 RX ORDER — ESCITALOPRAM OXALATE 5 MG/1
5 TABLET ORAL DAILY
Qty: 90 TABLET | Refills: 1 | Status: SHIPPED | OUTPATIENT
Start: 2024-07-08 | End: 2024-12-26

## 2024-07-31 ENCOUNTER — TELEPHONE (OUTPATIENT)
Age: 69
End: 2024-07-31

## 2024-11-18 RX ORDER — ZOLPIDEM TARTRATE 6.25 MG/1
6.25 TABLET, FILM COATED, EXTENDED RELEASE ORAL NIGHTLY PRN
Qty: 90 TABLET | Refills: 3 | Status: SHIPPED | OUTPATIENT
Start: 2024-11-18 | End: 2024-11-19 | Stop reason: SDUPTHER

## 2024-11-19 RX ORDER — ZOLPIDEM TARTRATE 6.25 MG/1
6.25 TABLET, FILM COATED, EXTENDED RELEASE ORAL NIGHTLY PRN
Qty: 90 TABLET | Refills: 3 | Status: SHIPPED | OUTPATIENT
Start: 2024-11-19

## 2024-12-26 RX ORDER — ESCITALOPRAM OXALATE 5 MG/1
5 TABLET ORAL DAILY
Qty: 90 TABLET | Refills: 1 | Status: SHIPPED | OUTPATIENT
Start: 2024-12-26

## 2025-01-02 SDOH — ECONOMIC STABILITY: FOOD INSECURITY: WITHIN THE PAST 12 MONTHS, THE FOOD YOU BOUGHT JUST DIDN'T LAST AND YOU DIDN'T HAVE MONEY TO GET MORE.: NEVER TRUE

## 2025-01-02 SDOH — ECONOMIC STABILITY: INCOME INSECURITY: IN THE LAST 12 MONTHS, WAS THERE A TIME WHEN YOU WERE NOT ABLE TO PAY THE MORTGAGE OR RENT ON TIME?: NO

## 2025-01-02 SDOH — ECONOMIC STABILITY: FOOD INSECURITY: WITHIN THE PAST 12 MONTHS, YOU WORRIED THAT YOUR FOOD WOULD RUN OUT BEFORE YOU GOT MONEY TO BUY MORE.: NEVER TRUE

## 2025-01-02 SDOH — ECONOMIC STABILITY: TRANSPORTATION INSECURITY
IN THE PAST 12 MONTHS, HAS LACK OF TRANSPORTATION KEPT YOU FROM MEETINGS, WORK, OR FROM GETTING THINGS NEEDED FOR DAILY LIVING?: NO

## 2025-01-02 SDOH — ECONOMIC STABILITY: TRANSPORTATION INSECURITY
IN THE PAST 12 MONTHS, HAS THE LACK OF TRANSPORTATION KEPT YOU FROM MEDICAL APPOINTMENTS OR FROM GETTING MEDICATIONS?: NO

## 2025-01-02 ASSESSMENT — SOCIAL DETERMINANTS OF HEALTH (SDOH): IN THE PAST 12 MONTHS, HAS THE ELECTRIC, GAS, OIL, OR WATER COMPANY THREATENED TO SHUT OFF SERVICE IN YOUR HOME?: NO

## 2025-01-09 ENCOUNTER — OFFICE VISIT (OUTPATIENT)
Dept: INTERNAL MEDICINE | Facility: CLINIC | Age: 70
End: 2025-01-09
Payer: MEDICARE

## 2025-01-09 VITALS
HEIGHT: 66 IN | SYSTOLIC BLOOD PRESSURE: 122 MMHG | WEIGHT: 143 LBS | DIASTOLIC BLOOD PRESSURE: 80 MMHG | RESPIRATION RATE: 16 BRPM | BODY MASS INDEX: 22.98 KG/M2 | HEART RATE: 84 BPM | OXYGEN SATURATION: 94 %

## 2025-01-09 DIAGNOSIS — R53.83 FATIGUE, UNSPECIFIED TYPE: Primary | ICD-10-CM

## 2025-01-09 DIAGNOSIS — E78.5 HYPERLIPIDEMIA, UNSPECIFIED HYPERLIPIDEMIA TYPE: ICD-10-CM

## 2025-01-09 DIAGNOSIS — E03.9 HYPOTHYROIDISM, UNSPECIFIED TYPE: ICD-10-CM

## 2025-01-09 DIAGNOSIS — I1A.0 RESISTANT HYPERTENSION: ICD-10-CM

## 2025-01-09 LAB
BASOPHILS # BLD: 0.07 K/UL (ref 0.01–0.1)
BASOPHILS NFR BLD: 1.3 %
DIFFERENTIAL METHOD BLD: NORMAL
EOSINOPHIL # BLD: 0.19 K/UL (ref 0.04–0.36)
EOSINOPHIL NFR BLD: 3.5 %
ERYTHROCYTE [DISTWIDTH] IN BLOOD BY AUTOMATED COUNT: 13.8 % (ref 11.7–14.4)
HCT VFR BLD AUTO: 40.2 % (ref 35–45)
HGB BLD-MCNC: 13.6 G/DL (ref 11.8–15.7)
IMM GRANULOCYTES # BLD AUTO: 0.01 K/UL (ref 0–0.08)
IMM GRANULOCYTES NFR BLD AUTO: 0.2 %
LYMPHOCYTES # BLD: 1.28 K/UL (ref 1.2–3.5)
LYMPHOCYTES NFR BLD: 23.5 %
MCH RBC QN AUTO: 30.7 PG (ref 28–33.2)
MCHC RBC AUTO-ENTMCNC: 33.8 G/DL (ref 32.2–35.5)
MCV RBC AUTO: 90.7 FL (ref 83–98)
MONOCYTES # BLD: 0.53 K/UL (ref 0.28–0.8)
MONOCYTES NFR BLD: 9.7 %
NEUTROPHILS # BLD: 3.36 K/UL (ref 1.7–7)
NEUTS SEG NFR BLD: 61.8 %
NRBC BLD-RTO: 0 %
PLATELET # BLD AUTO: 264 K/UL (ref 150–369)
PMV BLD AUTO: 10.4 FL (ref 9.4–12.3)
RBC # BLD AUTO: 4.43 M/UL (ref 3.93–5.22)
WBC # BLD AUTO: 5.44 K/UL (ref 3.8–10.5)

## 2025-01-09 PROCEDURE — G8754 DIAS BP LESS 90: HCPCS | Performed by: INTERNAL MEDICINE

## 2025-01-09 PROCEDURE — 84439 ASSAY OF FREE THYROXINE: CPT | Performed by: INTERNAL MEDICINE

## 2025-01-09 PROCEDURE — 90679 RSV VACC PREF RECOMB ADJT IM: CPT | Performed by: INTERNAL MEDICINE

## 2025-01-09 PROCEDURE — 90471 IMMUNIZATION ADMIN: CPT | Performed by: INTERNAL MEDICINE

## 2025-01-09 PROCEDURE — G8752 SYS BP LESS 140: HCPCS | Performed by: INTERNAL MEDICINE

## 2025-01-09 PROCEDURE — 80061 LIPID PANEL: CPT | Performed by: INTERNAL MEDICINE

## 2025-01-09 PROCEDURE — 83695 ASSAY OF LIPOPROTEIN(A): CPT | Performed by: INTERNAL MEDICINE

## 2025-01-09 PROCEDURE — 86140 C-REACTIVE PROTEIN: CPT | Performed by: INTERNAL MEDICINE

## 2025-01-09 PROCEDURE — 84443 ASSAY THYROID STIM HORMONE: CPT | Performed by: INTERNAL MEDICINE

## 2025-01-09 PROCEDURE — 80053 COMPREHEN METABOLIC PANEL: CPT | Performed by: INTERNAL MEDICINE

## 2025-01-09 PROCEDURE — 85025 COMPLETE CBC W/AUTO DIFF WBC: CPT | Performed by: INTERNAL MEDICINE

## 2025-01-09 PROCEDURE — 99214 OFFICE O/P EST MOD 30 MIN: CPT | Mod: 25 | Performed by: INTERNAL MEDICINE

## 2025-01-09 ASSESSMENT — ENCOUNTER SYMPTOMS
ALLERGIC/IMMUNOLOGIC NEGATIVE: 1
ARTHRALGIAS: 1
NEUROLOGICAL NEGATIVE: 1
GASTROINTESTINAL NEGATIVE: 1
RESPIRATORY NEGATIVE: 1
HEMATOLOGIC/LYMPHATIC NEGATIVE: 1
CARDIOVASCULAR NEGATIVE: 1
PSYCHIATRIC NEGATIVE: 1
FATIGUE: 1
ENDOCRINE NEGATIVE: 1
MYALGIAS: 1

## 2025-01-09 ASSESSMENT — PATIENT HEALTH QUESTIONNAIRE - PHQ9: SUM OF ALL RESPONSES TO PHQ9 QUESTIONS 1 & 2: 0

## 2025-01-09 NOTE — PROGRESS NOTES
.  69-year-old woman here today for a checkup after a 2-year absence.  She says she has been well and is followed closely for her history of breast cancer and is just learned that she has the genetic picture for breast cancer and her 35-year-old daughter has just been diagnosed with breast cancer  She tries to obtain exercise 3 to 5 days a week faithfully even though she does not like it very much.  She is very closely followed at the cancer center and is up-to-date with colonoscopy and preventative issues.  She does want her RSV vaccine today and we will give that to her.  She is followed by rheumatology for her diagnosis of rheumatoid arthritis and her most significant joints are her lumbar spine and her left wrist which are chronically painful.  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    Aortic dilatation (CMS/HCC) 09/07/2022    Benign essential hypertension 05/27/2020    Thoracic aortic aneurysm without rupture (CMS/HCC) 05/13/2022    Hyponatremia 12/16/2022    Post covid-19 condition, unspecified 12/16/2022    Lightheadedness 12/23/2022    COVID-19 12/01/2022    Muscle cramps 01/04/2023    Osteoarthritis 01/31/2023    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   HENT: Negative.     Respiratory: Negative.     Cardiovascular: Negative.    Gastrointestinal: Negative.    Endocrine: Negative.    Genitourinary: Negative.    Musculoskeletal:  Positive for arthralgias and myalgias.   Skin: Negative.    Allergic/Immunologic: Negative.    Neurological: Negative.    Hematological: Negative.    Psychiatric/Behavioral: Negative.     All other systems reviewed and are negative.    Current Outpatient Medications on File Prior to Visit   Medication Sig Dispense Refill    amLODIPine (NORVASC) 2.5 mg tablet Take 5 mg by mouth daily.      cholecalciferol, vitamin D3, (VITAMIN D3) 50 mcg (2,000 unit) capsule Vitamin D 50 MCG (2000 UT) Oral Capsule QTY: 30 capsule Days: 30 Refills: 11  Written: 05/16/23 Patient Instructions: 1 tablet po daily      meloxicam (MOBIC) 7.5 mg tablet Take 1 tablet (7.5 mg total) by mouth daily.      valsartan (DIOVAN) 80 mg tablet       zolpidem CR (AMBIEN CR) 6.25 mg CR tablet Take 1 tablet (6.25 mg total) by mouth nightly as needed for sleep. 90 tablet 3    eletriptan (RELPAX) 40 mg tablet Take 1 tablet (40 mg total) by mouth once as needed for migraine. May repeat in 2 hours if unresolved. Do not exceed 80 mg in 24 hours. 6 tablet 0    escitalopram (LEXAPRO) 5 mg tablet TAKE ONE TABLET BY MOUTH EVERY DAY 90 tablet 1     No current facility-administered medications on file prior to visit.   Visit Vitals  /80 (BP Location: Right upper arm, Patient Position: Sitting)   Pulse 84   Resp 16   Ht 1.676 m (5' 6\")   Wt 64.9 kg (143 lb)   SpO2 94%   BMI 23.08 kg/m²     Physical Exam  HENT:      Head: Normocephalic and atraumatic.      Right Ear: External ear normal.      Left Ear: External ear normal.   Eyes:      Conjunctiva/sclera: Conjunctivae normal.      Pupils: Pupils are equal, round, and reactive to light. "   Cardiovascular:      Rate and Rhythm: Normal rate and regular rhythm.      Heart sounds: Normal heart sounds.   Pulmonary:      Effort: Pulmonary effort is normal.      Breath sounds: Normal breath sounds.   Chest:   Breasts:     Right: Normal.      Left: Normal.   Abdominal:      General: Bowel sounds are normal.      Palpations: Abdomen is soft.   Musculoskeletal:         General: Normal range of motion.      Cervical back: Normal range of motion and neck supple.   Skin:     General: Skin is warm and dry.      Capillary Refill: Capillary refill takes less than 2 seconds.   Neurological:      Mental Status: She is alert and oriented to person, place, and time.   Psychiatric:         Behavior: Behavior normal.         Thought Content: Thought content normal.         Judgment: Judgment normal.     Jolanta was seen today for wellness visit.    Diagnoses and all orders for this visit:    Fatigue, unspecified type  -     C-reactive protein  She has not recently been ill and although had COVID the second time was not as profound as the initial COVID infection and it did cause her to have more fatigue.  She has a past history of hyponatremia and has not been monitored recently  Resistant hypertension  -     Comprehensive metabolic panel  -     CBC and differential  We will continue amlodipine and valsartan  Hyperlipidemia, unspecified hyperlipidemia type  -     Lipid panel  -     Lipoprotein (a)  Not on medication  Hypothyroidism, unspecified type  -     TSH  -     T4, free  We will monitor thyroid function  Other orders  -     RSV adjuvant vaccine (AREXVY) IM - PCP Practices Only

## 2025-01-10 LAB
ALBUMIN SERPL-MCNC: 4.7 G/DL (ref 3.5–5.7)
ALP SERPL-CCNC: 60 IU/L (ref 34–125)
ALT SERPL-CCNC: 14 IU/L (ref 7–52)
ANION GAP SERPL CALC-SCNC: 12 MEQ/L (ref 3–15)
AST SERPL-CCNC: 17 IU/L (ref 13–39)
BILIRUB SERPL-MCNC: 0.7 MG/DL (ref 0.3–1.2)
BUN SERPL-MCNC: 17 MG/DL (ref 7–25)
CALCIUM SERPL-MCNC: 9.7 MG/DL (ref 8.6–10.3)
CHLORIDE SERPL-SCNC: 102 MEQ/L (ref 98–107)
CHOLEST SERPL-MCNC: 249 MG/DL
CO2 SERPL-SCNC: 22 MEQ/L (ref 21–31)
CREAT SERPL-MCNC: 0.7 MG/DL (ref 0.6–1.2)
CRP SERPL-MCNC: <1 MG/L
EGFRCR SERPLBLD CKD-EPI 2021: >60 ML/MIN/1.73M*2
GLUCOSE SERPL-MCNC: 97 MG/DL (ref 70–99)
HDLC SERPL-MCNC: 81 MG/DL
HDLC SERPL: 3.1 {RATIO}
LDLC SERPL CALC-MCNC: 152 MG/DL
NONHDLC SERPL-MCNC: 168 MG/DL
POTASSIUM SERPL-SCNC: 4.4 MEQ/L (ref 3.5–5.1)
PROT SERPL-MCNC: 6.9 G/DL (ref 6–8.2)
SODIUM SERPL-SCNC: 136 MEQ/L (ref 136–145)
T4 FREE SERPL-MCNC: 0.62 NG/DL (ref 0.58–1.64)
TRIGL SERPL-MCNC: 79 MG/DL
TSH SERPL DL<=0.05 MIU/L-ACNC: 1.33 MIU/L (ref 0.34–5.6)

## 2025-01-11 LAB — LPA SERPL-SCNC: 21 NMOL/L

## 2025-04-17 PROCEDURE — 99490 CHRNC CARE MGMT STAFF 1ST 20: CPT

## 2025-07-10 RX ORDER — ESCITALOPRAM OXALATE 5 MG/1
5 TABLET ORAL DAILY
Qty: 90 TABLET | Refills: 1 | Status: SHIPPED | OUTPATIENT
Start: 2025-07-10

## 2025-08-06 RX ORDER — ZOLPIDEM TARTRATE 6.25 MG/1
6.25 TABLET, FILM COATED, EXTENDED RELEASE ORAL NIGHTLY PRN
Qty: 90 TABLET | Refills: 3 | Status: SHIPPED | OUTPATIENT
Start: 2025-08-06 | End: 2026-08-01

## 2025-08-06 RX ORDER — ZOLPIDEM TARTRATE 6.25 MG/1
6.25 TABLET, FILM COATED, EXTENDED RELEASE ORAL NIGHTLY PRN
Qty: 90 TABLET | Refills: 3 | OUTPATIENT
Start: 2025-08-06 | End: 2026-08-01